# Patient Record
Sex: FEMALE | Race: WHITE | Employment: OTHER | URBAN - METROPOLITAN AREA
[De-identification: names, ages, dates, MRNs, and addresses within clinical notes are randomized per-mention and may not be internally consistent; named-entity substitution may affect disease eponyms.]

---

## 2017-07-10 ENCOUNTER — OFFICE VISIT (OUTPATIENT)
Dept: DERMATOLOGY CLINIC | Facility: CLINIC | Age: 67
End: 2017-07-10

## 2017-07-10 DIAGNOSIS — L82.1 SEBORRHEIC KERATOSES: Primary | ICD-10-CM

## 2017-07-10 DIAGNOSIS — D23.21: ICD-10-CM

## 2017-07-10 DIAGNOSIS — D23.5 BENIGN NEOPLASM OF SKIN OF TRUNK, EXCEPT SCROTUM: ICD-10-CM

## 2017-07-10 DIAGNOSIS — D23.4 BENIGN NEOPLASM OF SCALP AND SKIN OF NECK: ICD-10-CM

## 2017-07-10 DIAGNOSIS — D23.30 BENIGN NEOPLASM OF SKIN OF FACE: ICD-10-CM

## 2017-07-10 DIAGNOSIS — D23.60 BENIGN NEOPLASM OF SKIN OF UPPER LIMB, INCLUDING SHOULDER, UNSPECIFIED LATERALITY: ICD-10-CM

## 2017-07-10 PROCEDURE — 99213 OFFICE O/P EST LOW 20 MIN: CPT | Performed by: DERMATOLOGY

## 2017-07-10 PROCEDURE — 99212 OFFICE O/P EST SF 10 MIN: CPT | Performed by: DERMATOLOGY

## 2017-07-10 RX ORDER — TRIAMCINOLONE ACETONIDE 5 MG/G
CREAM TOPICAL
Qty: 15 G | Refills: 1 | Status: SHIPPED | OUTPATIENT
Start: 2017-07-10 | End: 2018-05-25 | Stop reason: ALTCHOICE

## 2017-07-30 NOTE — PROGRESS NOTES
Johann Akbar is a 73year old female. HPI:     CC:  Patient presents with:  Full Skin Exam: LOV 10/5/2015. Pt presenting for a full body skin exam. Pt has a family hx BCC (mother and father). Lesion: Pt concerned with lesion to R ear. c/o pain 1-2/10. local anesthetic No     Social History Narrative   None on file     Family History   Problem Relation Age of Onset   • Basal Cell Father    • Basal Cell Mother        There were no vitals filed for this visit.     HPI:    Patient presents with:  Full Skin E diagnosis)  Benign neoplasm of skin of upper limb, including shoulder, unspecified laterality  Benign neoplasm of skin of trunk, except scrotum  Benign neoplasm of skin of face  Benign neoplasm of scalp and skin of neck  Benign neoplasm of ear and external

## 2017-10-24 PROBLEM — E03.9 HYPOTHYROIDISM, UNSPECIFIED TYPE: Status: ACTIVE | Noted: 2017-10-24

## 2017-10-24 PROCEDURE — 88175 CYTOPATH C/V AUTO FLUID REDO: CPT | Performed by: INTERNAL MEDICINE

## 2017-10-25 ENCOUNTER — LAB ENCOUNTER (OUTPATIENT)
Dept: LAB | Facility: HOSPITAL | Age: 67
End: 2017-10-25
Attending: INTERNAL MEDICINE
Payer: COMMERCIAL

## 2017-10-25 ENCOUNTER — HOSPITAL ENCOUNTER (OUTPATIENT)
Dept: GENERAL RADIOLOGY | Facility: HOSPITAL | Age: 67
Discharge: HOME | End: 2017-10-25
Attending: INTERNAL MEDICINE
Payer: COMMERCIAL

## 2017-10-25 DIAGNOSIS — Z01.419 WELL WOMAN EXAM WITH ROUTINE GYNECOLOGICAL EXAM: ICD-10-CM

## 2017-10-25 DIAGNOSIS — Z11.59 ENCOUNTER FOR HEPATITIS C SCREENING TEST FOR LOW RISK PATIENT: ICD-10-CM

## 2017-10-25 DIAGNOSIS — R05.9 COUGH: ICD-10-CM

## 2017-10-25 DIAGNOSIS — E03.9 HYPOTHYROIDISM, UNSPECIFIED TYPE: ICD-10-CM

## 2017-10-25 PROCEDURE — 84443 ASSAY THYROID STIM HORMONE: CPT

## 2017-10-25 PROCEDURE — 36415 COLL VENOUS BLD VENIPUNCTURE: CPT

## 2017-10-25 PROCEDURE — 80061 LIPID PANEL: CPT

## 2017-10-25 PROCEDURE — 71020 XR CHEST PA + LAT CHEST (CPT=71020): CPT | Performed by: INTERNAL MEDICINE

## 2017-10-25 PROCEDURE — 80053 COMPREHEN METABOLIC PANEL: CPT

## 2017-10-25 PROCEDURE — 86803 HEPATITIS C AB TEST: CPT

## 2017-10-25 PROCEDURE — 85025 COMPLETE CBC W/AUTO DIFF WBC: CPT

## 2017-10-28 ENCOUNTER — HOSPITAL ENCOUNTER (OUTPATIENT)
Dept: BONE DENSITY | Age: 67
Discharge: HOME | End: 2017-10-28
Attending: INTERNAL MEDICINE
Payer: COMMERCIAL

## 2017-10-28 ENCOUNTER — HOSPITAL ENCOUNTER (OUTPATIENT)
Dept: MAMMOGRAPHY | Age: 67
Discharge: HOME | End: 2017-10-28
Attending: INTERNAL MEDICINE
Payer: COMMERCIAL

## 2017-10-28 DIAGNOSIS — Z13.820 SCREENING FOR OSTEOPOROSIS: ICD-10-CM

## 2017-10-28 DIAGNOSIS — Z12.31 ENCOUNTER FOR SCREENING MAMMOGRAM FOR MALIGNANT NEOPLASM OF BREAST: ICD-10-CM

## 2017-10-28 PROCEDURE — 77067 SCR MAMMO BI INCL CAD: CPT | Performed by: INTERNAL MEDICINE

## 2017-10-28 PROCEDURE — 77063 BREAST TOMOSYNTHESIS BI: CPT | Performed by: INTERNAL MEDICINE

## 2017-10-28 PROCEDURE — 77080 DXA BONE DENSITY AXIAL: CPT | Performed by: INTERNAL MEDICINE

## 2017-12-04 ENCOUNTER — OFFICE VISIT (OUTPATIENT)
Dept: DERMATOLOGY CLINIC | Facility: CLINIC | Age: 67
End: 2017-12-04

## 2017-12-04 DIAGNOSIS — H61.001 CHONDRODERMATITIS NODULARIS HELICIS OF RIGHT EAR: Primary | ICD-10-CM

## 2017-12-04 DIAGNOSIS — D23.60 BENIGN NEOPLASM OF SKIN OF UPPER LIMB, INCLUDING SHOULDER, UNSPECIFIED LATERALITY: ICD-10-CM

## 2017-12-04 DIAGNOSIS — D23.5 BENIGN NEOPLASM OF SKIN OF TRUNK, EXCEPT SCROTUM: ICD-10-CM

## 2017-12-04 DIAGNOSIS — L82.1 SEBORRHEIC KERATOSES: ICD-10-CM

## 2017-12-04 DIAGNOSIS — D23.30 BENIGN NEOPLASM OF SKIN OF FACE: ICD-10-CM

## 2017-12-04 DIAGNOSIS — D23.4 BENIGN NEOPLASM OF SCALP AND SKIN OF NECK: ICD-10-CM

## 2017-12-04 PROCEDURE — 99212 OFFICE O/P EST SF 10 MIN: CPT | Performed by: DERMATOLOGY

## 2017-12-04 PROCEDURE — 99213 OFFICE O/P EST LOW 20 MIN: CPT | Performed by: DERMATOLOGY

## 2017-12-17 NOTE — PROGRESS NOTES
Rajiv Overall is a 78year old female.   HPI:     CC:  Patient presents with:  Derm Problem: \"Established pt\"- Pt would like to f/u with cyst located to right ear pt notes tender when laying down pt notes using the hydrocortisone cream with slight improv Hypothyroid    • Obesity      Past Surgical History:  No date: ARTHROSCOPY, KNEE, SURGICAL; W/LATERAL RELEASE Bilateral  No date:   2012: COLONOSCOPY  No date: D & C    Social History  Social History   Marital status:   Spouse name: N conjunctival mucosa, eyelids, lips external ears, back, chest,/ breasts, axillae,  abdomen, arms, legs, palms.      Multiple light to medium brown, well marginated, uniformly pigmented, macules and papules 6 mm and less scattered on exam. pigmented lesions lesions. Signs and symptoms of skin cancer, ABCDE's of melanoma discussed with patient. Sunscreen use, sun protection, self exams reviewed. Followup as noted RTC routine checkup 6 mos - one year or p.r.n.

## 2018-05-25 ENCOUNTER — APPOINTMENT (OUTPATIENT)
Dept: LAB | Facility: HOSPITAL | Age: 68
End: 2018-05-25
Attending: INTERNAL MEDICINE
Payer: COMMERCIAL

## 2018-05-25 ENCOUNTER — HOSPITAL ENCOUNTER (OUTPATIENT)
Dept: GENERAL RADIOLOGY | Facility: HOSPITAL | Age: 68
Discharge: HOME | End: 2018-05-25
Attending: INTERNAL MEDICINE
Payer: COMMERCIAL

## 2018-05-25 DIAGNOSIS — I10 ESSENTIAL HYPERTENSION: ICD-10-CM

## 2018-05-25 DIAGNOSIS — R06.00 DYSPNEA ON EXERTION: ICD-10-CM

## 2018-05-25 PROCEDURE — 71046 X-RAY EXAM CHEST 2 VIEWS: CPT | Performed by: INTERNAL MEDICINE

## 2018-05-25 PROCEDURE — 83880 ASSAY OF NATRIURETIC PEPTIDE: CPT

## 2018-05-25 PROCEDURE — 85027 COMPLETE CBC AUTOMATED: CPT

## 2018-05-25 PROCEDURE — 36415 COLL VENOUS BLD VENIPUNCTURE: CPT

## 2018-05-25 PROCEDURE — 80053 COMPREHEN METABOLIC PANEL: CPT

## 2018-06-07 ENCOUNTER — LAB ENCOUNTER (OUTPATIENT)
Dept: LAB | Facility: HOSPITAL | Age: 68
End: 2018-06-07
Attending: INTERNAL MEDICINE
Payer: COMMERCIAL

## 2018-06-07 DIAGNOSIS — D64.9 ANEMIA, UNSPECIFIED TYPE: ICD-10-CM

## 2018-06-07 DIAGNOSIS — D64.9 ANEMIA: Primary | ICD-10-CM

## 2018-06-07 PROCEDURE — 84466 ASSAY OF TRANSFERRIN: CPT

## 2018-06-07 PROCEDURE — 36415 COLL VENOUS BLD VENIPUNCTURE: CPT

## 2018-06-07 PROCEDURE — 83540 ASSAY OF IRON: CPT

## 2018-06-07 PROCEDURE — 83021 HEMOGLOBIN CHROMOTOGRAPHY: CPT

## 2018-06-07 PROCEDURE — 83020 HEMOGLOBIN ELECTROPHORESIS: CPT

## 2018-10-11 PROBLEM — D64.9 ANEMIA, UNSPECIFIED TYPE: Status: ACTIVE | Noted: 2018-10-11

## 2018-10-11 PROCEDURE — 88175 CYTOPATH C/V AUTO FLUID REDO: CPT | Performed by: INTERNAL MEDICINE

## 2018-10-13 ENCOUNTER — LAB ENCOUNTER (OUTPATIENT)
Dept: LAB | Facility: HOSPITAL | Age: 68
End: 2018-10-13
Attending: INTERNAL MEDICINE
Payer: COMMERCIAL

## 2018-10-13 ENCOUNTER — HOSPITAL ENCOUNTER (OUTPATIENT)
Dept: MAMMOGRAPHY | Age: 68
Discharge: HOME | End: 2018-10-13
Attending: INTERNAL MEDICINE
Payer: COMMERCIAL

## 2018-10-13 DIAGNOSIS — Z12.31 ENCOUNTER FOR SCREENING MAMMOGRAM FOR MALIGNANT NEOPLASM OF BREAST: ICD-10-CM

## 2018-10-13 DIAGNOSIS — D64.9 ANEMIA, UNSPECIFIED TYPE: ICD-10-CM

## 2018-10-13 DIAGNOSIS — E78.00 HIGH CHOLESTEROL: ICD-10-CM

## 2018-10-13 PROCEDURE — 77067 SCR MAMMO BI INCL CAD: CPT | Performed by: INTERNAL MEDICINE

## 2018-10-13 PROCEDURE — 85025 COMPLETE CBC W/AUTO DIFF WBC: CPT

## 2018-10-13 PROCEDURE — 36415 COLL VENOUS BLD VENIPUNCTURE: CPT

## 2018-10-13 PROCEDURE — 77063 BREAST TOMOSYNTHESIS BI: CPT | Performed by: INTERNAL MEDICINE

## 2018-10-13 PROCEDURE — 80061 LIPID PANEL: CPT

## 2018-10-15 ENCOUNTER — HOSPITAL ENCOUNTER (OUTPATIENT)
Dept: ULTRASOUND IMAGING | Facility: HOSPITAL | Age: 68
Discharge: HOME | End: 2018-10-15
Attending: INTERNAL MEDICINE
Payer: COMMERCIAL

## 2018-10-15 ENCOUNTER — OFFICE VISIT (OUTPATIENT)
Dept: DERMATOLOGY CLINIC | Facility: CLINIC | Age: 68
End: 2018-10-15
Payer: COMMERCIAL

## 2018-10-15 VITALS — HEIGHT: 61 IN | BODY MASS INDEX: 37.76 KG/M2 | WEIGHT: 200 LBS

## 2018-10-15 DIAGNOSIS — H61.003: Primary | ICD-10-CM

## 2018-10-15 DIAGNOSIS — D64.9 ANEMIA, UNSPECIFIED TYPE: ICD-10-CM

## 2018-10-15 DIAGNOSIS — D23.30 BENIGN NEOPLASM OF SKIN OF FACE: ICD-10-CM

## 2018-10-15 DIAGNOSIS — D23.5 BENIGN NEOPLASM OF SKIN OF TRUNK, EXCEPT SCROTUM: ICD-10-CM

## 2018-10-15 DIAGNOSIS — D23.60 BENIGN NEOPLASM OF SKIN OF UPPER LIMB, INCLUDING SHOULDER, UNSPECIFIED LATERALITY: ICD-10-CM

## 2018-10-15 DIAGNOSIS — R10.30 LOWER ABDOMINAL PAIN: ICD-10-CM

## 2018-10-15 DIAGNOSIS — L82.1 SEBORRHEIC KERATOSES: ICD-10-CM

## 2018-10-15 DIAGNOSIS — D23.4 BENIGN NEOPLASM OF SCALP AND SKIN OF NECK: ICD-10-CM

## 2018-10-15 PROCEDURE — 99212 OFFICE O/P EST SF 10 MIN: CPT | Performed by: DERMATOLOGY

## 2018-10-15 PROCEDURE — 76830 TRANSVAGINAL US NON-OB: CPT | Performed by: INTERNAL MEDICINE

## 2018-10-15 PROCEDURE — 99213 OFFICE O/P EST LOW 20 MIN: CPT | Performed by: DERMATOLOGY

## 2018-10-15 PROCEDURE — 76856 US EXAM PELVIC COMPLETE: CPT | Performed by: INTERNAL MEDICINE

## 2018-10-28 NOTE — PROGRESS NOTES
Ayo Renee is a 78year old female. HPI:     CC:  Patient presents with:  Lesion: scaly crust ,painful to touch ,patient denies personal HX but parents had BCC         Allergies:  Patient has no known allergies.     HISTORY:    Past Medical History: Socioeconomic History      Marital status:       Spouse name: Not on file      Number of children: Not on file      Years of education: Not on file      Highest education level: Not on file    Social Needs      Financial resource strain: Not on file oriented in no acute distress. Exam total-body performed, including scalp, head, neck, face,nails, hair, external eyes, including conjunctival mucosa, eyelids, lips external ears, back, chest,/ breasts, axillae,  abdomen, arms, legs, palms.      Multiple l lentigines over the arms hands. No suspicious lesions    Other benign nevi lentigines keratoses no significant changes numerous benign keratoses. Family history of [address] continue regular follow-up  Please refer to map for specific lesions.   See additiona

## 2018-12-17 ENCOUNTER — HOSPITAL ENCOUNTER (OUTPATIENT)
Dept: GENERAL RADIOLOGY | Facility: HOSPITAL | Age: 68
Discharge: HOME | End: 2018-12-17
Attending: CHIROPRACTOR
Payer: MEDICARE

## 2018-12-17 DIAGNOSIS — M54.16 LUMBAR RADICULOPATHY: ICD-10-CM

## 2018-12-17 DIAGNOSIS — M54.50 LOW BACK PAIN: ICD-10-CM

## 2018-12-17 PROCEDURE — 72110 X-RAY EXAM L-2 SPINE 4/>VWS: CPT | Performed by: CHIROPRACTOR

## 2019-02-07 ENCOUNTER — LAB ENCOUNTER (OUTPATIENT)
Dept: LAB | Facility: HOSPITAL | Age: 69
End: 2019-02-07
Attending: INTERNAL MEDICINE
Payer: MEDICARE

## 2019-02-07 DIAGNOSIS — D64.9 ANEMIA, UNSPECIFIED TYPE: ICD-10-CM

## 2019-02-07 DIAGNOSIS — G89.29 CHRONIC MIDLINE LOW BACK PAIN WITHOUT SCIATICA: ICD-10-CM

## 2019-02-07 DIAGNOSIS — M54.50 CHRONIC MIDLINE LOW BACK PAIN WITHOUT SCIATICA: ICD-10-CM

## 2019-02-07 LAB
BASOPHILS # BLD AUTO: 0.03 X10(3) UL (ref 0–0.2)
BASOPHILS NFR BLD AUTO: 0.6 %
DEPRECATED RDW RBC AUTO: 46 FL (ref 35.1–46.3)
EOSINOPHIL # BLD AUTO: 0.21 X10(3) UL (ref 0–0.7)
EOSINOPHIL NFR BLD AUTO: 4.3 %
ERYTHROCYTE [DISTWIDTH] IN BLOOD BY AUTOMATED COUNT: 13.1 % (ref 11–15)
HCT VFR BLD AUTO: 35 % (ref 35–48)
HGB BLD-MCNC: 11.5 G/DL (ref 12–16)
IGA SERPL-MCNC: 167 MG/DL (ref 68–378)
IGM SERPL-MCNC: 90 MG/DL (ref 60–263)
IMM GRANULOCYTES # BLD AUTO: 0.01 X10(3) UL (ref 0–1)
IMM GRANULOCYTES NFR BLD: 0.2 %
IMMUNOGLOBULIN PNL SER-MCNC: 1084 MG/DL (ref 694–1618)
LYMPHOCYTES # BLD AUTO: 1.83 X10(3) UL (ref 1–4)
LYMPHOCYTES NFR BLD AUTO: 37.6 %
MCH RBC QN AUTO: 31.5 PG (ref 26–34)
MCHC RBC AUTO-ENTMCNC: 32.9 G/DL (ref 31–37)
MCV RBC AUTO: 95.9 FL (ref 80–100)
MONOCYTES # BLD AUTO: 0.28 X10(3) UL (ref 0.1–1)
MONOCYTES NFR BLD AUTO: 5.7 %
NEUTROPHILS # BLD AUTO: 2.51 X10 (3) UL (ref 1.5–7.7)
NEUTROPHILS # BLD AUTO: 2.51 X10(3) UL (ref 1.5–7.7)
NEUTROPHILS NFR BLD AUTO: 51.6 %
PLATELET # BLD AUTO: 300 10(3)UL (ref 150–450)
RBC # BLD AUTO: 3.65 X10(6)UL (ref 3.8–5.3)
WBC # BLD AUTO: 4.9 X10(3) UL (ref 4–11)

## 2019-02-07 PROCEDURE — 84165 PROTEIN E-PHORESIS SERUM: CPT

## 2019-02-07 PROCEDURE — 36415 COLL VENOUS BLD VENIPUNCTURE: CPT

## 2019-02-07 PROCEDURE — 86334 IMMUNOFIX E-PHORESIS SERUM: CPT

## 2019-02-07 PROCEDURE — 85025 COMPLETE CBC W/AUTO DIFF WBC: CPT

## 2019-02-07 PROCEDURE — 82784 ASSAY IGA/IGD/IGG/IGM EACH: CPT

## 2019-02-11 LAB
ALBUMIN SERPL ELPH-MCNC: 4.31 G/DL (ref 3.75–5.21)
ALBUMIN/GLOB SERPL: 1.44 {RATIO} (ref 1–2)
ALPHA1 GLOB SERPL ELPH-MCNC: 0.28 G/DL (ref 0.19–0.46)
ALPHA2 GLOB SERPL ELPH-MCNC: 0.71 G/DL (ref 0.48–1.05)
B-GLOBULIN SERPL ELPH-MCNC: 0.88 G/DL (ref 0.68–1.23)
GAMMA GLOB SERPL ELPH-MCNC: 1.12 G/DL (ref 0.62–1.7)
TOTAL PROTEIN (SPECIAL TESTING): 7.3 G/DL (ref 6.5–9.1)

## 2019-08-27 PROBLEM — M54.41 CHRONIC BILATERAL LOW BACK PAIN WITH RIGHT-SIDED SCIATICA: Status: ACTIVE | Noted: 2019-08-27

## 2019-08-27 PROBLEM — M19.90 ARTHRITIS: Status: ACTIVE | Noted: 2019-08-27

## 2019-08-27 PROBLEM — M47.816 LUMBAR SPONDYLOSIS: Status: ACTIVE | Noted: 2019-08-27

## 2019-08-27 PROBLEM — I10 ESSENTIAL HYPERTENSION: Status: ACTIVE | Noted: 2019-08-27

## 2019-08-27 PROBLEM — G89.29 CHRONIC BILATERAL LOW BACK PAIN WITH RIGHT-SIDED SCIATICA: Status: ACTIVE | Noted: 2019-08-27

## 2019-08-28 ENCOUNTER — LAB ENCOUNTER (OUTPATIENT)
Dept: LAB | Facility: HOSPITAL | Age: 69
End: 2019-08-28
Attending: INTERNAL MEDICINE
Payer: MEDICARE

## 2019-08-28 DIAGNOSIS — I10 ESSENTIAL HYPERTENSION: ICD-10-CM

## 2019-08-28 DIAGNOSIS — M19.90 ARTHRITIS: ICD-10-CM

## 2019-08-28 LAB
ALBUMIN SERPL-MCNC: 3.5 G/DL (ref 3.4–5)
ANION GAP SERPL CALC-SCNC: 5 MMOL/L (ref 0–18)
BASOPHILS # BLD AUTO: 0.04 X10(3) UL (ref 0–0.2)
BASOPHILS NFR BLD AUTO: 0.8 %
BUN BLD-MCNC: 30 MG/DL (ref 7–18)
BUN/CREAT SERPL: 40.5 (ref 10–20)
CALCIUM BLD-MCNC: 9 MG/DL (ref 8.5–10.1)
CHLORIDE SERPL-SCNC: 108 MMOL/L (ref 98–112)
CO2 SERPL-SCNC: 26 MMOL/L (ref 21–32)
CREAT BLD-MCNC: 0.74 MG/DL (ref 0.55–1.02)
CRP SERPL-MCNC: 0.7 MG/DL (ref ?–0.3)
DEPRECATED RDW RBC AUTO: 46.2 FL (ref 35.1–46.3)
EOSINOPHIL # BLD AUTO: 0.23 X10(3) UL (ref 0–0.7)
EOSINOPHIL NFR BLD AUTO: 4.8 %
ERYTHROCYTE [DISTWIDTH] IN BLOOD BY AUTOMATED COUNT: 12.9 % (ref 11–15)
ERYTHROCYTE [SEDIMENTATION RATE] IN BLOOD: 25 MM/HR (ref 0–30)
GLUCOSE BLD-MCNC: 109 MG/DL (ref 70–99)
HCT VFR BLD AUTO: 33.6 % (ref 35–48)
HGB BLD-MCNC: 11.1 G/DL (ref 12–16)
IMM GRANULOCYTES # BLD AUTO: 0.01 X10(3) UL (ref 0–1)
IMM GRANULOCYTES NFR BLD: 0.2 %
LYMPHOCYTES # BLD AUTO: 1.67 X10(3) UL (ref 1–4)
LYMPHOCYTES NFR BLD AUTO: 35 %
MCH RBC QN AUTO: 32.5 PG (ref 26–34)
MCHC RBC AUTO-ENTMCNC: 33 G/DL (ref 31–37)
MCV RBC AUTO: 98.2 FL (ref 80–100)
MONOCYTES # BLD AUTO: 0.38 X10(3) UL (ref 0.1–1)
MONOCYTES NFR BLD AUTO: 8 %
NEUTROPHILS # BLD AUTO: 2.44 X10 (3) UL (ref 1.5–7.7)
NEUTROPHILS # BLD AUTO: 2.44 X10(3) UL (ref 1.5–7.7)
NEUTROPHILS NFR BLD AUTO: 51.2 %
OSMOLALITY SERPL CALC.SUM OF ELEC: 295 MOSM/KG (ref 275–295)
PHOSPHATE SERPL-MCNC: 3.5 MG/DL (ref 2.5–4.9)
PLATELET # BLD AUTO: 272 10(3)UL (ref 150–450)
POTASSIUM SERPL-SCNC: 4.8 MMOL/L (ref 3.5–5.1)
RBC # BLD AUTO: 3.42 X10(6)UL (ref 3.8–5.3)
RHEUMATOID FACT SERPL-ACNC: <10 IU/ML (ref ?–15)
SODIUM SERPL-SCNC: 139 MMOL/L (ref 136–145)
WBC # BLD AUTO: 4.8 X10(3) UL (ref 4–11)

## 2019-08-28 PROCEDURE — 86200 CCP ANTIBODY: CPT

## 2019-08-28 PROCEDURE — 86140 C-REACTIVE PROTEIN: CPT

## 2019-08-28 PROCEDURE — 85652 RBC SED RATE AUTOMATED: CPT

## 2019-08-28 PROCEDURE — 36415 COLL VENOUS BLD VENIPUNCTURE: CPT

## 2019-08-28 PROCEDURE — 85025 COMPLETE CBC W/AUTO DIFF WBC: CPT

## 2019-08-28 PROCEDURE — 80069 RENAL FUNCTION PANEL: CPT

## 2019-08-28 PROCEDURE — 86431 RHEUMATOID FACTOR QUANT: CPT

## 2019-08-30 LAB — CCP IGG SERPL-ACNC: 1.2 U/ML (ref 0–6.9)

## 2019-09-10 ENCOUNTER — OFFICE VISIT (OUTPATIENT)
Dept: NEUROLOGY | Facility: CLINIC | Age: 69
End: 2019-09-10
Payer: MEDICARE

## 2019-09-10 ENCOUNTER — HOSPITAL ENCOUNTER (OUTPATIENT)
Dept: GENERAL RADIOLOGY | Facility: HOSPITAL | Age: 69
Discharge: HOME | End: 2019-09-10
Attending: PHYSICAL MEDICINE & REHABILITATION
Payer: MEDICARE

## 2019-09-10 VITALS
BODY MASS INDEX: 36.25 KG/M2 | RESPIRATION RATE: 16 BRPM | HEART RATE: 76 BPM | DIASTOLIC BLOOD PRESSURE: 82 MMHG | HEIGHT: 61 IN | WEIGHT: 192 LBS | SYSTOLIC BLOOD PRESSURE: 142 MMHG

## 2019-09-10 DIAGNOSIS — M51.36 LUMBAR ADJACENT SEGMENT DISEASE WITH SPONDYLOLISTHESIS: ICD-10-CM

## 2019-09-10 DIAGNOSIS — M43.16 LUMBAR ADJACENT SEGMENT DISEASE WITH SPONDYLOLISTHESIS: ICD-10-CM

## 2019-09-10 DIAGNOSIS — M43.16 SPONDYLOLISTHESIS AT L4-L5 LEVEL: Primary | ICD-10-CM

## 2019-09-10 PROCEDURE — 72110 X-RAY EXAM L-2 SPINE 4/>VWS: CPT | Performed by: PHYSICAL MEDICINE & REHABILITATION

## 2019-09-10 PROCEDURE — 99204 OFFICE O/P NEW MOD 45 MIN: CPT | Performed by: PHYSICAL MEDICINE & REHABILITATION

## 2019-09-10 RX ORDER — METHYLPREDNISOLONE 4 MG/1
TABLET ORAL
Qty: 1 PACKAGE | Refills: 0 | Status: SHIPPED | OUTPATIENT
Start: 2019-09-10 | End: 2019-09-24 | Stop reason: ALTCHOICE

## 2019-09-10 NOTE — PATIENT INSTRUCTIONS
Stop the ibuprofen while taking medrol dosepak. Do not start physical therapy until you have heard back regarding the results of the Xray.

## 2019-09-10 NOTE — H&P
Sugey Vivas 38    History and Physical    Lovebill Rushing Patient Status:  No patient class for patient encounter    1950 MRN [id number]   [facility] Attending No att. providers found   Hosp Day # 0 SURGICAL; W/LATERAL RELEASE Bilateral    •      • COLONOSCOPY  2012   • D & C       Family History   Problem Relation Age of Onset   • Basal Cell Father    • Hypertension Father    • Heart Disorder Father    • Basal Cell Mother    • Cancer M Pulmonary/Chest: Effort normal.   Musculoskeletal:   Lumbar range of motion: Pain with lumbar extension; no pain with lumbar flexion. Palpation: No pain with palpation of the paravertebral musculature, spinous processes, or SI joints bilaterally.  No a gluteal granulomas.     =====  CONCLUSION: No acute osseous abnormality of the lumbar spine. There is grade 1 anterolisthesis of L4 on L5 without pars defects. There is disc disease at L3-4 and L4-5 and mild osteoarthritis throughout the lumbar spine. lumbar stabilization, core strengthening. Medrol Dosepak, patient instructed to stop ibuprofen while taking the Medrol Dosepak. Follow-up at/near completion of physical therapy assuming no significant instability seen on flexion and extension views.     H

## 2019-09-11 ENCOUNTER — TELEPHONE (OUTPATIENT)
Dept: NEUROLOGY | Facility: CLINIC | Age: 69
End: 2019-09-11

## 2019-09-11 NOTE — TELEPHONE ENCOUNTER
Informed patient of imaging results and recommendation's per Dr. Anne Sandhoff. Patient verbalized understanding. Patient states [facility] at [facility] has no availability until 10/10/19.  Informed patient she should try and see if Prosper Duggan or Kee Goyal has earlier appt

## 2019-09-11 NOTE — TELEPHONE ENCOUNTER
Notes recorded by Myles April, DO on 9/11/2019 at 1:57 PM CDT  Please let the patient know the Xrays are good - there is no abnormal movement seen with forward and backward bending. She should proceed with physical therapy.

## 2019-09-24 PROBLEM — M48.061 SPINAL STENOSIS AT L4-L5 LEVEL: Status: ACTIVE | Noted: 2019-09-24

## 2019-09-26 ENCOUNTER — OFFICE VISIT (OUTPATIENT)
Dept: PHYSICAL THERAPY | Age: 69
End: 2019-09-26
Attending: PHYSICAL MEDICINE & REHABILITATION
Payer: MEDICARE

## 2019-09-26 DIAGNOSIS — M43.16 SPONDYLOLISTHESIS AT L4-L5 LEVEL: ICD-10-CM

## 2019-09-26 PROCEDURE — 97530 THERAPEUTIC ACTIVITIES: CPT

## 2019-09-26 PROCEDURE — 97162 PT EVAL MOD COMPLEX 30 MIN: CPT

## 2019-09-26 PROCEDURE — 97110 THERAPEUTIC EXERCISES: CPT

## 2019-09-26 NOTE — PATIENT INSTRUCTIONS
Access Code: [email or web address]   URL: [email or web address]   Date: 09/26/2019   Prepared by: Brian Reyna     Exercises  Supine Pelvic Tilt - 10 reps - 2 sets - 2x daily - 7x weekly  Bent Knee Fallouts - 10 reps - 2 sets - 2x daily - 7x w

## 2019-09-26 NOTE — PROGRESS NOTES
SPINE EVALUATION:   Referring Physician: Dr. Nathaly Sánchez  Diagnosis:    Spondylolisthesis at L4-L5 level (M43.16) Date of Service: 9/26/2019     PATIENT Chrissy Carroll is a 80year old female who presents to therapy today with complaints of back amador anesthesia, and KATERINA LE N/T.    Sarah Chavez presents to physical therapy evaluation with primary c/o low back pain with radiating symptoms down her R leg and symptoms do not go below the knee.  The results of the objective tests and measures show decre decreased  Piriformis: R decreased; L decreased  Quads: R NT; L NT  Gastroc-soleus: R NT; L NT     Special tests:   Slump test: R = (-), L = (-)  Supine SLR test: R = (-), L = (-)  Repeated lumbar movement testing: (Baseline symptoms = lumbar pain)  1.  Fle at least 4+/5 MMT grade strength to perform sit to stand transfer with good motor control and without signs of imbalance.    5. Patient will demonstrate normalized gait mechanics with sufficient hip extension and good foot clearance to reduce risk for falls

## 2019-09-27 ENCOUNTER — APPOINTMENT (OUTPATIENT)
Dept: PHYSICAL THERAPY | Age: 69
End: 2019-09-27
Attending: PHYSICAL MEDICINE & REHABILITATION
Payer: MEDICARE

## 2019-10-01 ENCOUNTER — APPOINTMENT (OUTPATIENT)
Dept: PHYSICAL THERAPY | Age: 69
End: 2019-10-01
Attending: PHYSICAL MEDICINE & REHABILITATION
Payer: MEDICARE

## 2019-10-01 DIAGNOSIS — M43.16 SPONDYLOLISTHESIS AT L4-L5 LEVEL: ICD-10-CM

## 2019-10-01 PROCEDURE — 97110 THERAPEUTIC EXERCISES: CPT

## 2019-10-01 PROCEDURE — 97140 MANUAL THERAPY 1/> REGIONS: CPT

## 2019-10-01 PROCEDURE — 97112 NEUROMUSCULAR REEDUCATION: CPT

## 2019-10-01 NOTE — PROGRESS NOTES
Dx:     Spondylolisthesis at L4-L5 level (M43.16)     Insurance (Authorized # of Visits):  Medicare BCBS PPO; 12 visits; cert ends 61/72/6721          Authorizing Physician: Dr. Faye Bradley MD visit: none scheduled  Fall Risk: elevated         Precaution demonstrate normalized gait mechanics with sufficient hip extension and good foot clearance to reduce risk for falls. Plan: Focus on ROM, strengthening and add balance exercises to improve transfers and gait.      Date: 10/1/2019  TX#: 2/12 Date:

## 2019-10-03 ENCOUNTER — APPOINTMENT (OUTPATIENT)
Dept: PHYSICAL THERAPY | Age: 69
End: 2019-10-03
Attending: PHYSICAL MEDICINE & REHABILITATION
Payer: MEDICARE

## 2019-10-08 ENCOUNTER — OFFICE VISIT (OUTPATIENT)
Dept: PHYSICAL THERAPY | Age: 69
End: 2019-10-08
Attending: PHYSICAL MEDICINE & REHABILITATION
Payer: MEDICARE

## 2019-10-08 DIAGNOSIS — M43.16 SPONDYLOLISTHESIS AT L4-L5 LEVEL: ICD-10-CM

## 2019-10-08 PROCEDURE — 97110 THERAPEUTIC EXERCISES: CPT

## 2019-10-08 PROCEDURE — 97140 MANUAL THERAPY 1/> REGIONS: CPT

## 2019-10-08 NOTE — PROGRESS NOTES
Dx:     Spondylolisthesis at L4-L5 level (M43.16)     Insurance (Authorized # of Visits):  Medicare BCBS PPO; 12 visits; cert ends 65/77/6591          Authorizing Physician: Dr. Tracy Samples  Next MD visit: none scheduled  Fall Risk: elevated         Precaution alignment to sit for at least 60 minutes without increased symptoms to ease her ability to travel. 3. Patient will have trunk flex and ext ROM WFL to bend forward to pick items up from the floor without difficulty.   4. Patient will have at least 4+/5 MMT

## 2019-10-10 ENCOUNTER — APPOINTMENT (OUTPATIENT)
Dept: PHYSICAL THERAPY | Age: 69
End: 2019-10-10
Attending: PHYSICAL MEDICINE & REHABILITATION
Payer: MEDICARE

## 2019-10-11 ENCOUNTER — OFFICE VISIT (OUTPATIENT)
Dept: PHYSICAL THERAPY | Age: 69
End: 2019-10-11
Attending: PHYSICAL MEDICINE & REHABILITATION
Payer: MEDICARE

## 2019-10-11 DIAGNOSIS — M43.16 SPONDYLOLISTHESIS AT L4-L5 LEVEL: ICD-10-CM

## 2019-10-11 PROCEDURE — 97112 NEUROMUSCULAR REEDUCATION: CPT

## 2019-10-11 PROCEDURE — 97140 MANUAL THERAPY 1/> REGIONS: CPT

## 2019-10-11 PROCEDURE — 97110 THERAPEUTIC EXERCISES: CPT

## 2019-10-15 ENCOUNTER — APPOINTMENT (OUTPATIENT)
Dept: PHYSICAL THERAPY | Age: 69
End: 2019-10-15
Attending: PHYSICAL MEDICINE & REHABILITATION
Payer: MEDICARE

## 2019-10-16 ENCOUNTER — OFFICE VISIT (OUTPATIENT)
Dept: PHYSICAL THERAPY | Age: 69
End: 2019-10-16
Attending: PHYSICAL MEDICINE & REHABILITATION
Payer: MEDICARE

## 2019-10-16 DIAGNOSIS — M43.16 SPONDYLOLISTHESIS AT L4-L5 LEVEL: ICD-10-CM

## 2019-10-16 PROCEDURE — 97140 MANUAL THERAPY 1/> REGIONS: CPT

## 2019-10-16 PROCEDURE — 97112 NEUROMUSCULAR REEDUCATION: CPT

## 2019-10-16 NOTE — PROGRESS NOTES
Progress Summary  Pt has attended 5 visits in Physical Therapy.      Dx:     Spondylolisthesis at L4-L5 level (M43.16)     Insurance (Authorized # of Visits):  Medicare BCBS PPO; 12 visits; cert ends 43/88/3511          Authorizing Physician: Dr. Srinivas Perry improvements in pain during the day after she performs her exercises, reporting feeling 50% better during the daytime.  However, because the patient reports increasing severity of symptoms at night, her functional ability is limited as indicated by decrease any questions, please contact me at Dept: [phone].     Sincerely,  Electronically signed by therapist: Shannon Jain PT     Physician's certification required:  Yes  Please co-sign or sign and return this letter via fax as soon as possible to 331- engaging gluteals x 3 min  Sitting: HS curls with tband; 10x2 Neuro Re-ed  Diaphragmatic breathing in sitting x5 min  Sitting: HS curls with tband; 10x2  Pelvic tilts in sitting ; 10x2             HEP: pelvic tilts; BKFOs; sciatic n.  Neuromob; toe curls; L

## 2019-10-16 NOTE — PROGRESS NOTES
Dx:     Spondylolisthesis at L4-L5 level (M43.16)     Insurance (Authorized # of Visits):  Medicare BCBS PPO; 12 visits; cert ends 12/52/7483          Authorizing Physician: Dr. Ballesteros Reasons  Next MD visit: none scheduled  Fall Risk: elevated         Precaution mechanics with sufficient hip extension and good foot clearance to reduce risk for falls. Plan: Focus on ROM, strengthening and add balance exercises to improve transfers and gait.      Date: 10/1/2019  TX#: 2/12 Date: 10/9/2019               TX#: 3/12

## 2019-10-17 ENCOUNTER — APPOINTMENT (OUTPATIENT)
Dept: PHYSICAL THERAPY | Age: 69
End: 2019-10-17
Attending: PHYSICAL MEDICINE & REHABILITATION
Payer: MEDICARE

## 2019-10-17 ENCOUNTER — OFFICE VISIT (OUTPATIENT)
Dept: NEUROLOGY | Facility: CLINIC | Age: 69
End: 2019-10-17
Payer: MEDICARE

## 2019-10-17 VITALS
DIASTOLIC BLOOD PRESSURE: 76 MMHG | RESPIRATION RATE: 20 BRPM | BODY MASS INDEX: 36.25 KG/M2 | HEIGHT: 61 IN | WEIGHT: 192 LBS | HEART RATE: 84 BPM | SYSTOLIC BLOOD PRESSURE: 134 MMHG

## 2019-10-17 DIAGNOSIS — M43.16 SPONDYLOLISTHESIS AT L4-L5 LEVEL: Primary | ICD-10-CM

## 2019-10-17 PROCEDURE — 99214 OFFICE O/P EST MOD 30 MIN: CPT | Performed by: PHYSICAL MEDICINE & REHABILITATION

## 2019-10-17 RX ORDER — DOXEPIN HYDROCHLORIDE 10 MG/1
10 CAPSULE ORAL NIGHTLY
Qty: 30 CAPSULE | Refills: 0 | Status: SHIPPED | OUTPATIENT
Start: 2019-10-17 | End: 2019-11-05 | Stop reason: ALTCHOICE

## 2019-10-17 NOTE — PROGRESS NOTES
HPI:    Patient ID: Ayo Renee is a 80year old female. 60-year-old female presents for follow-up. Has been participating physical therapy with noted improvement in symptoms, however she still awakens at nighttime due to pain.   She localizes her pa SODIUM 100 MCG Oral Tab, TAKE ONE TABLET BY MOUTH ONE TIME DAILY , Disp: 90 tablet, Rfl: 2      Allergies:No Known Allergies   PHYSICAL EXAM:   Physical Exam   Constitutional: She appears well-developed and well-nourished.    HENT:   Head: Normocephalic and

## 2019-10-21 ENCOUNTER — OFFICE VISIT (OUTPATIENT)
Dept: PHYSICAL THERAPY | Age: 69
End: 2019-10-21
Attending: PHYSICAL MEDICINE & REHABILITATION
Payer: MEDICARE

## 2019-10-21 ENCOUNTER — TELEPHONE (OUTPATIENT)
Dept: NEUROLOGY | Facility: CLINIC | Age: 69
End: 2019-10-21

## 2019-10-21 DIAGNOSIS — M43.16 SPONDYLOLISTHESIS AT L4-L5 LEVEL: Primary | ICD-10-CM

## 2019-10-21 DIAGNOSIS — M43.16 SPONDYLOLISTHESIS AT L4-L5 LEVEL: ICD-10-CM

## 2019-10-21 PROCEDURE — 97112 NEUROMUSCULAR REEDUCATION: CPT

## 2019-10-21 PROCEDURE — 97140 MANUAL THERAPY 1/> REGIONS: CPT

## 2019-10-21 RX ORDER — METHYLPREDNISOLONE 4 MG/1
TABLET ORAL
Qty: 1 PACKAGE | Refills: 0 | Status: SHIPPED | OUTPATIENT
Start: 2019-10-21 | End: 2019-11-05 | Stop reason: ALTCHOICE

## 2019-10-21 NOTE — PROGRESS NOTES
Dx:     Spondylolisthesis at L4-L5 level (M43.16)     Insurance (Authorized # of Visits):  Medicare BCBS PPO; 12 visits; cert ends 6/96/1880       Authorizing Physician: Dr. Janeth Carpenter  Next MD visit: none scheduled  Fall Risk: elevated         Precautions: optimize gains made in PT to home and community settings and for self management of prophylactic care. 2. Patient will demonstrate good postural alignment to sit for at least 60 minutes without increased symptoms to ease her ability to travel.   3. Patient x 12 min  Supine: PROM B hips; PROM R knee x 8 min  Harmonic oscillation B hips x5 min Manual Therapy  Sidelying: MFR lumbar paraspinals x 12 min  Supine: PROM B hips; PROM R knee x 8 min  Harmonic oscillation B hips x5 min   Neuro Re-ed  Gait training: em

## 2019-10-21 NOTE — TELEPHONE ENCOUNTER
Spoke to patient she state she is taking Doxepin. Say she is still waking up in the middle of the night. When waking up in the morning she is icing and after eating something takes 3 Ibuprofen.  Patient is asking if she can take another dose of steriods sin

## 2019-10-21 NOTE — TELEPHONE ENCOUNTER
Medrol dosepack prescribed. Please let the patient know if we proceed with injection we should do it at least a week after the medrol dosepack has been completed, if not later.     Soniya Minium DO  Physical Medicine and Rehabilitation  [facility]

## 2019-10-22 ENCOUNTER — APPOINTMENT (OUTPATIENT)
Dept: PHYSICAL THERAPY | Age: 69
End: 2019-10-22
Attending: PHYSICAL MEDICINE & REHABILITATION
Payer: MEDICARE

## 2019-10-23 ENCOUNTER — OFFICE VISIT (OUTPATIENT)
Dept: PHYSICAL THERAPY | Age: 69
End: 2019-10-23
Attending: PHYSICAL MEDICINE & REHABILITATION
Payer: MEDICARE

## 2019-10-23 DIAGNOSIS — M43.16 SPONDYLOLISTHESIS AT L4-L5 LEVEL: ICD-10-CM

## 2019-10-23 PROCEDURE — 97140 MANUAL THERAPY 1/> REGIONS: CPT

## 2019-10-23 PROCEDURE — 97112 NEUROMUSCULAR REEDUCATION: CPT

## 2019-11-04 ENCOUNTER — TELEPHONE (OUTPATIENT)
Dept: NEUROLOGY | Facility: CLINIC | Age: 69
End: 2019-11-04

## 2019-11-04 DIAGNOSIS — M43.16 SPONDYLOLISTHESIS AT L4-L5 LEVEL: Primary | ICD-10-CM

## 2019-11-04 NOTE — TELEPHONE ENCOUNTER
S/w patient who states she has been off the medrol dosepak for about 1.5 weeks and her pain is starting to return. She would like to proceed injection discussed at [address] [place] [address]. Informed patient we will get authorization and call her to schedule afterwards.      Lucas

## 2019-11-04 NOTE — TELEPHONE ENCOUNTER
Patient has been scheduled for a bilateral L4 transforaminal epidural steroid injection on 11/20/19 at the [facility]. Medications and allergies reviewed.  Patient informed to hold aspirins, nsaids, blood thinners, multivitamins, vitamin E and fish oils 3-7 days

## 2019-11-04 NOTE — TELEPHONE ENCOUNTER
Medicare Online for authorization of procedure bilateral L4 transforaminal epidural steroid injection CPT code: 28545-70. Procedure is a covered benefit and does not require authorization. Will inform Nursing.

## 2019-11-05 ENCOUNTER — LAB ENCOUNTER (OUTPATIENT)
Dept: LAB | Facility: HOSPITAL | Age: 69
End: 2019-11-05
Attending: INTERNAL MEDICINE
Payer: MEDICARE

## 2019-11-05 ENCOUNTER — OFFICE VISIT (OUTPATIENT)
Dept: PHYSICAL THERAPY | Age: 69
End: 2019-11-05
Attending: PHYSICAL MEDICINE & REHABILITATION
Payer: MEDICARE

## 2019-11-05 ENCOUNTER — HOSPITAL ENCOUNTER (OUTPATIENT)
Dept: MAMMOGRAPHY | Age: 69
Discharge: HOME | End: 2019-11-05
Attending: INTERNAL MEDICINE
Payer: MEDICARE

## 2019-11-05 DIAGNOSIS — Z12.31 BREAST CANCER SCREENING BY MAMMOGRAM: ICD-10-CM

## 2019-11-05 DIAGNOSIS — Z13.820 SCREENING FOR OSTEOPOROSIS: ICD-10-CM

## 2019-11-05 DIAGNOSIS — Z12.31 VISIT FOR SCREENING MAMMOGRAM: ICD-10-CM

## 2019-11-05 DIAGNOSIS — Z13.29 ENCOUNTER FOR SCREENING FOR ENDOCRINE DISORDER: ICD-10-CM

## 2019-11-05 DIAGNOSIS — R26.89 BALANCE DISORDER: ICD-10-CM

## 2019-11-05 DIAGNOSIS — Z13.0 SCREENING FOR DISORDER OF BLOOD AND BLOOD-FORMING ORGANS: ICD-10-CM

## 2019-11-05 PROBLEM — D64.9 ANEMIA, UNSPECIFIED TYPE: Status: RESOLVED | Noted: 2018-10-11 | Resolved: 2019-11-05

## 2019-11-05 PROCEDURE — 84439 ASSAY OF FREE THYROXINE: CPT

## 2019-11-05 PROCEDURE — 77063 BREAST TOMOSYNTHESIS BI: CPT | Performed by: INTERNAL MEDICINE

## 2019-11-05 PROCEDURE — 84443 ASSAY THYROID STIM HORMONE: CPT

## 2019-11-05 PROCEDURE — 97112 NEUROMUSCULAR REEDUCATION: CPT

## 2019-11-05 PROCEDURE — 85025 COMPLETE CBC W/AUTO DIFF WBC: CPT

## 2019-11-05 PROCEDURE — 82306 VITAMIN D 25 HYDROXY: CPT

## 2019-11-05 PROCEDURE — 97110 THERAPEUTIC EXERCISES: CPT

## 2019-11-05 PROCEDURE — 82607 VITAMIN B-12: CPT

## 2019-11-05 PROCEDURE — 77067 SCR MAMMO BI INCL CAD: CPT | Performed by: INTERNAL MEDICINE

## 2019-11-05 PROCEDURE — 97140 MANUAL THERAPY 1/> REGIONS: CPT

## 2019-11-05 PROCEDURE — 36415 COLL VENOUS BLD VENIPUNCTURE: CPT

## 2019-11-05 NOTE — PROGRESS NOTES
Dx:     Spondylolisthesis at L4-L5 level (M43.16)     Insurance (Authorized # of Visits):  Medicare BCBS PPO; 12 visits; cert ends 6/18/7131       Authorizing Physician: Dr. Fred Navarro  Next MD visit: none scheduled  Fall Risk: elevated         Precautions: h trunk and engage gluteals when weight shifting to reduce signs of hip drop, particularly when loading through R LE. Goals: (to be met in 12 visits)   1.  Patient will be independent with HEP to optimize gains made in PT to home and community settings a min  Supine: PROM B hips; PROM R knee x 6 min  Harmonic oscillation B hips x5 min Manual Therapy  Sidelying: MFR lumbar paraspinals x 4 min  Supine: PROM B hips; PROM R knee x 5 min  Harmonic oscillation B hips x4 min   Neuro Re-ed  Gait training: emphasis

## 2019-11-08 ENCOUNTER — OFFICE VISIT (OUTPATIENT)
Dept: PHYSICAL THERAPY | Age: 69
End: 2019-11-08
Attending: INTERNAL MEDICINE
Payer: MEDICARE

## 2019-11-08 PROCEDURE — 97112 NEUROMUSCULAR REEDUCATION: CPT

## 2019-11-08 PROCEDURE — 97140 MANUAL THERAPY 1/> REGIONS: CPT

## 2019-11-08 PROCEDURE — 97110 THERAPEUTIC EXERCISES: CPT

## 2019-11-08 NOTE — PROGRESS NOTES
Dx:     Spondylolisthesis at L4-L5 level (M43.16)     Insurance (Authorized # of Visits):  Medicare BCBS PPO; 12 visits; cert ends 7/01/6242       Authorizing Physician: Dr. Narcisa Camp  Next MD visit: none scheduled  Fall Risk: elevated         Precautions: h with signs of hip drop and will continue to benefit from hip strengthening to ease gait and transfers. Goals: (to be met in 12 visits)   1.  Patient will be independent with HEP to optimize gains made in PT to home and community settings and for self man min  Harmonic oscillation B hips x4 min Manual Therapy  Sidelying: MFR lumbar paraspinals x 4 min  Supine: PROM B hips; PROM R knee x 4 min  Harmonic oscillation B hips x4 min   Neuro Re-ed  Diaphragmatic breathing in sitting x5 min  Sitting: HS curls with

## 2019-11-12 ENCOUNTER — OFFICE VISIT (OUTPATIENT)
Dept: PHYSICAL THERAPY | Age: 69
End: 2019-11-12
Attending: PHYSICAL MEDICINE & REHABILITATION
Payer: MEDICARE

## 2019-11-12 DIAGNOSIS — M43.16 SPONDYLOLISTHESIS AT L4-L5 LEVEL: ICD-10-CM

## 2019-11-12 PROCEDURE — 97140 MANUAL THERAPY 1/> REGIONS: CPT

## 2019-11-12 PROCEDURE — 97110 THERAPEUTIC EXERCISES: CPT

## 2019-11-12 RX ORDER — DOXEPIN HYDROCHLORIDE 10 MG/1
10 CAPSULE ORAL NIGHTLY
Qty: 30 CAPSULE | Refills: 0 | OUTPATIENT
Start: 2019-11-12

## 2019-11-12 NOTE — TELEPHONE ENCOUNTER
Decline refill to Doxepin because patient is not taking medication anymore. Spoke to patient state that there is still pain in the lower back going down the legs to the knees. The steroids that was prescribed did make it better. Still doing therapy and believe it is helpful but still painful on the right side.

## 2019-11-12 NOTE — PROGRESS NOTES
Progress Summary  Pt has attended 10 visits in Physical Therapy.    Dx:     Spondylolisthesis at L4-L5 level (M43.16)     Insurance (Authorized # of Visits):  Medicare BCBS PPO; 12 visits; cert ends 4/79/0236       Authorizing Physician: Dr. Gustavo Bradley 5/5  Great Toe Ext (L5): R 5/5, L 5/5  PF (S1): R 5/5; L 5/5 Hip flexion (L2): R 4 -/5 *; L 4+/5  Hip abduction: R 4/5; L 5/5  Hip Extension: R 3/5; L 3+/5   Hip ER: R 5/5; L 5/5  Hip IR: R 5/5; L 5/5  Knee Flexion: R 4/5; L 5/5   Knee extension (L3): R 5/ demonstrate normalized gait mechanics with sufficient hip extension and good foot clearance to reduce risk for falls. (In progress)  11/12/2019: Goals reviewed with pt.  All goals are in working progress    FOTO:   Intake 52%  Predicted 60%  11/12/2019 60% lumbar paraspinals x 5 min  Supine: PROM B hips; PROM R knee x 4 min  Harmonic oscillation B hips x 15 min (including education provided to pt's  with return demo)   Neuro Re-ed  Lateral steppin'x 2 laps  Monster walk fwd; 20'x 2 laps  March on

## 2019-11-15 ENCOUNTER — OFFICE VISIT (OUTPATIENT)
Dept: PHYSICAL THERAPY | Age: 69
End: 2019-11-15
Attending: PHYSICAL MEDICINE & REHABILITATION
Payer: MEDICARE

## 2019-11-15 PROCEDURE — 97140 MANUAL THERAPY 1/> REGIONS: CPT

## 2019-11-15 PROCEDURE — 97110 THERAPEUTIC EXERCISES: CPT

## 2019-11-15 NOTE — PROGRESS NOTES
Progress Summary  Pt has attended 10 visits in Physical Therapy.    Dx:     Spondylolisthesis at L4-L5 level (M43.16)     Insurance (Authorized # of Visits):  Medicare BCBS PPO; 18 visits; cert ends 3/93/5666       Authorizing Physician: Dr. Vipul Mason  Next exercises by pacing. Goals: (to be met in 12 visits)   1. Patient will be independent with HEP to optimize gains made in PT to home and community settings and for self management of prophylactic care. (In progress)  2.  Patient will demonstrate good po paraspinals x 5 min  Supine: PROM B hips; PROM R knee x 4 min  Harmonic oscillation B hips x 15 min (including education provided to pt's  with return demo) Manual Therapy  Sidelying: MFR lumbar paraspinals x 5 min  Supine: PROM B hips; PROM R knee

## 2019-11-19 ENCOUNTER — APPOINTMENT (OUTPATIENT)
Dept: PHYSICAL THERAPY | Age: 69
End: 2019-11-19
Payer: MEDICARE

## 2019-11-20 ENCOUNTER — OFFICE VISIT (OUTPATIENT)
Dept: SURGERY | Facility: CLINIC | Age: 69
End: 2019-11-20

## 2019-11-20 DIAGNOSIS — M43.16 SPONDYLOLISTHESIS AT L4-L5 LEVEL: Primary | ICD-10-CM

## 2019-11-20 PROCEDURE — 64483 NJX AA&/STRD TFRM EPI L/S 1: CPT | Performed by: PHYSICAL MEDICINE & REHABILITATION

## 2019-11-20 NOTE — PROCEDURES
[facility]    BILATERAL LUMBAR TRANSFORAMINAL   NAME:  Dhara Carvalho    MR #:    [id number] :  1950     PHYSICIAN:  Иван Blancas        Operative Report    DATE OF PROCEDURE: 2019   PREOPERATIVE DIAGNOSES: 1.  Spondy lidocaine without epinephrine. Then, a 5 inch, 22-gauge spinal needle was inserted and directed towards the left L4-5 intervertebral foramen.   When it felt to be in good position, AP fluoroscopy was used to advance the needle to the 6 o'clock position on

## 2019-11-22 ENCOUNTER — APPOINTMENT (OUTPATIENT)
Dept: PHYSICAL THERAPY | Age: 69
End: 2019-11-22
Payer: MEDICARE

## 2019-12-04 ENCOUNTER — OFFICE VISIT (OUTPATIENT)
Dept: PHYSICAL THERAPY | Age: 69
End: 2019-12-04
Attending: PHYSICAL MEDICINE & REHABILITATION
Payer: MEDICARE

## 2019-12-04 PROCEDURE — 97110 THERAPEUTIC EXERCISES: CPT

## 2019-12-04 PROCEDURE — 97140 MANUAL THERAPY 1/> REGIONS: CPT

## 2019-12-04 NOTE — PROGRESS NOTES
Dx:     Spondylolisthesis at L4-L5 level (M43.16)     Insurance (Authorized # of Visits):  Medicare BCBS PPO; 18 visits; cert ends 3/10/5638       Authorizing Physician: Dr. Chip Bradley MD visit: 12/19/2019 (injection with Dr. Chip Farias)  Fall Risk: Lawrance Holter today as pt has a foampad at home to progress her balance exercises. Some improvements in strength, but weakness still present at R hip and R knee. Goals: (to be met in 12 visits)   1.  Patient will be independent with HEP to optimize gains made in PT to Therapy  Sidelying: MFR lumbar paraspinals x 5 min  Supine: PROM B hips; PROM R knee x 4 min  Harmonic oscillation B hips x 15 min (including education provided to pt's  with return demo) Manual Therapy  Sidelying: MFR lumbar paraspinals x 5 min  Huratdo

## 2019-12-10 ENCOUNTER — OFFICE VISIT (OUTPATIENT)
Dept: PHYSICAL THERAPY | Age: 69
End: 2019-12-10
Attending: INTERNAL MEDICINE
Payer: MEDICARE

## 2019-12-10 PROCEDURE — 97112 NEUROMUSCULAR REEDUCATION: CPT

## 2019-12-10 PROCEDURE — 97140 MANUAL THERAPY 1/> REGIONS: CPT

## 2019-12-10 NOTE — PROGRESS NOTES
Dx:     Spondylolisthesis at L4-L5 level (M43.16)     Insurance (Authorized # of Visits):  Medicare BCBS PPO; 18 visits; cert ends 4/93/1223       Authorizing Physician: Dr. Sylvia Bradley MD visit: 12/19/2019 (injection with Dr. Sylvia Wilson)  Fall Risk: Vianey Ya - 10*;  - 8     Knee ROM (12/10/2019): (*denotes performed with pain)  Flex: R 122* (pre-tx)/127 (post-tx); L 135  Ext: R - 8*; L  - 4    Palpation: TTP at R greater trochanter      Assessment: Addressed stiffness at R hip and R knee with improved R knee R add ball 20x  Sitting: hip abd tband 20x  Step ups stairs: 10xB Therapeutic Exercise  SKTC stretch; 10x5\", B  Step ups stairs; 10xB   Manual Therapy  Sidelying: MFR lumbar paraspinals x 5 min  Supine: PROM B hips; PROM R knee x 4 min  Harmonic oscillation

## 2019-12-13 ENCOUNTER — APPOINTMENT (OUTPATIENT)
Dept: PHYSICAL THERAPY | Age: 69
End: 2019-12-13
Payer: MEDICARE

## 2019-12-18 ENCOUNTER — OFFICE VISIT (OUTPATIENT)
Dept: PHYSICAL THERAPY | Age: 69
End: 2019-12-18
Attending: PHYSICAL MEDICINE & REHABILITATION
Payer: MEDICARE

## 2019-12-18 PROCEDURE — 97112 NEUROMUSCULAR REEDUCATION: CPT

## 2019-12-18 PROCEDURE — 97140 MANUAL THERAPY 1/> REGIONS: CPT

## 2019-12-18 NOTE — PROGRESS NOTES
Discharge Summary  Pt has attended 12 visits in Physical Therapy.      Dx:     Spondylolisthesis at L4-L5 level (M43.16)     Insurance (Authorized # of Visits):  Medicare BCBS PPO; 18 visits; cert ends 3/56/6674       Authorizing Physician: Dr. Satinder Asif EMERGENCY DEPARTMENT HISTORY AND PHYSICAL EXAM 
 
8:22 AM 
 
 
Date: 9/26/2018 Patient Name: Geena Gutiérrez History of Presenting Illness Chief Complaint Patient presents with  
 Other History Provided By: Patient and patient's daughter Chief Complaint: Rossy  Duration: Unknown Timing:  N/A Location: N/A Quality: N/A Severity: N/A Modifying Factors: N/A Associated Symptoms: Dysuria and bilateral leg weakness. Additional History (Context): Geena Gutiérrez is a 69 y.o. male with a history of Parkinson's disease who presents with a fall with an unknown down time. The patient's daughter states she found him on the bathroom floor this morning. The patient's son had saw the patient yesterday evening and he was normal. Daughter does not know if the patient hit his head. The daughter states prior to arrival he was having episodes in which he was not orientated to place. The patient reports dysuria. He states he lost control of his legs. Daughter states the patient has been refusing to see a PCP and that he is not well hydrated. History limited due to altered mental status. PCP: None Past History Past Medical History: 
Past Medical History:  
Diagnosis Date  Enlarged prostate  Nocturia  Parkinson's disease ([address]  Vitamin D deficiency Past Surgical History: 
History reviewed. No pertinent surgical history. Family History: 
History reviewed. No pertinent family history. Social History: 
Social History Substance Use Topics  Smoking status: Former Smoker  Smokeless tobacco: Former User  Alcohol use No  
 
 
Allergies: 
No Known Allergies Review of Systems Review of Systems Unable to perform ROS: Mental status change Physical Exam  
 
Visit Vitals  /68  Pulse 66  Temp 97.5 °F (36.4 °C)  Resp 14  
 Ht 5' 11\" (1.803 m)  Wt 65.8 kg (145 lb)  SpO2 100%  BMI 20.22 kg/m2 Physical Exam  
 Constitutional: He is oriented to person, place, and time. He appears well-developed and well-nourished. He appears cachectic. No distress. HENT:  
Head: Normocephalic and atraumatic. Mouth/Throat: Oropharynx is clear and moist. Mucous membranes are dry. Eyes: Conjunctivae and EOM are normal. Pupils are equal, round, and reactive to light. No scleral icterus. Neck: Normal range of motion. Neck supple. Cardiovascular: Normal rate, regular rhythm and normal heart sounds. No murmur heard. Pulmonary/Chest: Effort normal and breath sounds normal. No respiratory distress. Abdominal: Soft. Bowel sounds are normal. He exhibits no distension. There is no tenderness. Musculoskeletal: He exhibits no edema. Lymphadenopathy:  
  He has no cervical adenopathy. Neurological: He is alert and oriented to person, place, and time. Coordination normal.  
Parkinsonian tremors. Skin: Skin is warm and dry. No rash noted. Abrasion right orbit. Stage 1 ulceration left hip. Psychiatric: He has a normal mood and affect. His behavior is normal.  
Nursing note and vitals reviewed. Diagnostic Study Results Labs - Recent Results (from the past 12 hour(s)) URINALYSIS W/ RFLX MICROSCOPIC Collection Time: 09/26/18  8:10 AM  
Result Value Ref Range Color YELLOW Appearance CLOUDY Specific gravity 1.016 1.005 - 1.030    
 pH (UA) 5.5 5.0 - 8.0 Protein 100 (A) NEG mg/dL Glucose NEGATIVE  NEG mg/dL Ketone 15 (A) NEG mg/dL Bilirubin NEGATIVE  NEG Blood MODERATE (A) NEG Urobilinogen 0.2 0.2 - 1.0 EU/dL Nitrites NEGATIVE  NEG Leukocyte Esterase LARGE (A) NEG URINE MICROSCOPIC ONLY Collection Time: 09/26/18  8:10 AM  
Result Value Ref Range WBC TOO NUMEROUS TO COUNT 0 - 4 /hpf  
 RBC 36 to 50 0 - 5 /hpf Epithelial cells 4+ 0 - 5 /lpf Bacteria 4+ (A) NEG /hpf  
EKG, 12 LEAD, INITIAL Collection Time: 09/26/18  8:17 AM  
Result Value Ref Range abduction: R 4 -/5*; L 4+/5  Hip Extension: R 3 -/5; L 3/5   Hip ER: R 4 -/5; L 5/5  Hip IR: R 5/5; L 5/5  Knee Flexion: R 3+/5*; L 5/5   Knee extension (L3): R 5/5; L 5/5   DF (L4): R 5/5; L 5/5  Great Toe Ext (L5): R 5/5, L 5/5  PF (S1): R 5/5; L 5/5 Hip Ventricular Rate 67 BPM  
 Atrial Rate 66 BPM  
 P-R Interval 152 ms QRS Duration 94 ms Q-T Interval 426 ms  
 QTC Calculation (Bezet) 450 ms Calculated P Axis 91 degrees Calculated R Axis 82 degrees Calculated T Axis 64 degrees Diagnosis Sinus rhythm No previous ECGs available Confirmed by Dandy Lu ([id number]) on 9/26/2018 10:27:13 AM 
  
CBC WITH AUTOMATED DIFF Collection Time: 09/26/18  8:20 AM  
Result Value Ref Range WBC 9.2 4.6 - 13.2 K/uL  
 RBC 3.81 (L) 4.70 - 5.50 M/uL  
 HGB 12.2 (L) 13.0 - 16.0 g/dL HCT 35.6 (L) 36.0 - 48.0 % MCV 93.4 74.0 - 97.0 FL  
 MCH 32.0 24.0 - 34.0 PG  
 MCHC 34.3 31.0 - 37.0 g/dL  
 RDW 13.0 11.6 - 14.5 % PLATELET 352 986 - 113 K/uL MPV 9.3 9.2 - 11.8 FL  
 NEUTROPHILS 80 (H) 40 - 73 % LYMPHOCYTES 12 (L) 21 - 52 % MONOCYTES 8 3 - 10 % EOSINOPHILS 0 0 - 5 % BASOPHILS 0 0 - 2 %  
 ABS. NEUTROPHILS 7.3 1.8 - 8.0 K/UL  
 ABS. LYMPHOCYTES 1.1 0.9 - 3.6 K/UL  
 ABS. MONOCYTES 0.8 0.05 - 1.2 K/UL  
 ABS. EOSINOPHILS 0.0 0.0 - 0.4 K/UL  
 ABS. BASOPHILS 0.0 0.0 - 0.1 K/UL  
 DF AUTOMATED METABOLIC PANEL, COMPREHENSIVE Collection Time: 09/26/18  8:20 AM  
Result Value Ref Range Sodium 141 136 - 145 mmol/L Potassium 3.3 (L) 3.5 - 5.5 mmol/L Chloride 104 100 - 108 mmol/L  
 CO2 26 21 - 32 mmol/L Anion gap 11 3.0 - 18 mmol/L Glucose 101 (H) 74 - 99 mg/dL BUN 37 (H) 7.0 - 18 MG/DL Creatinine 1.13 0.6 - 1.3 MG/DL  
 BUN/Creatinine ratio 33 (H) 12 - 20 GFR est AA >60 >60 ml/min/1.73m2 GFR est non-AA >60 >60 ml/min/1.73m2 Calcium 9.0 8.5 - 10.1 MG/DL Bilirubin, total 0.8 0.2 - 1.0 MG/DL  
 ALT (SGPT) 22 16 - 61 U/L  
 AST (SGOT) 36 15 - 37 U/L Alk. phosphatase 90 45 - 117 U/L Protein, total 8.2 6.4 - 8.2 g/dL Albumin 3.7 3.4 - 5.0 g/dL Globulin 4.5 (H) 2.0 - 4.0 g/dL A-G Ratio 0.8 0.8 - 1.7 LIPASE Collection Time: 09/26/18  8:20 AM  
Result Value Ref Range advised to follow up with Dr. Bakari Barbosa and will be discharged from PT at this time. Since deficits still remain she may benefit from PT again in the future. Goals: (to be met in 12 visits)   1.  Patient will be independent with HEP to optimize gains mad 11/15/2019  Tx#: 11/18 Date: 12/4/2019  Tx#: 12/18 Date: 12/10/2019  Tx#: 13/18 Date: 12/18/2019  Tc#: 14/18   Therapeutic Exercise  Supine: LTR 10x2  Supine: BKFOs 10x2  Supine: piriformis stretch 20\"x5  Supine: hip add ball 20x  Supine; hip abd tband 20 Lipase 64 (L) 73 - 393 U/L  
CARDIAC PANEL,(CK, CKMB & TROPONIN) Collection Time: 09/26/18  8:20 AM  
Result Value Ref Range  (H) 39 - 308 U/L  
 CK - MB 17.9 (H) <3.6 ng/ml CK-MB Index 2.5 0.0 - 4.0 % Troponin-I, Qt. <0.02 0.0 - 0.045 NG/ML  
MAGNESIUM Collection Time: 09/26/18  8:20 AM  
Result Value Ref Range Magnesium 2.2 1.6 - 2.6 mg/dL CULTURE, BLOOD Collection Time: 09/26/18  8:30 AM  
Result Value Ref Range Special Requests: PERIPHERAL Culture result: PENDING   
POC LACTIC ACID Collection Time: 09/26/18  8:33 AM  
Result Value Ref Range Lactic Acid (POC) 2.1 (HH) 0.4 - 2.0 mmol/L  
CULTURE, BLOOD Collection Time: 09/26/18  8:40 AM  
Result Value Ref Range Special Requests: PERIPHERAL Culture result: PENDING Radiologic Studies -  
CT HEAD WO CONT Final Result CT HEAD: 
 
IMPRESSION: 1.  White matter findings nonspecific but typically reflecting minor chronic 
ischemic change in a patient of this age. Slight parenchymal volume 
loss/atrophy.   
 
Please note that head CT may be negative in the acute setting and MRI could best 
further evaluate for acute/subacute ischemia/infarct in the high/persistent 
index of clinical suspicion. Medical Decision Making I am the first provider for this patient. I reviewed the vital signs, available nursing notes, past medical history, past surgical history, family history and social history. Vital Signs-Reviewed the patient's vital signs. Pulse Oximetry Analysis -  98 on room air (Interpretation) Cardiac Monitor: 
Rate: 66 Rhythm:  Normal Sinus Rhythm EKG: Interpreted by the EP. Time Interpreted: 3440 Rate: nsr 67 Rhythm: Normal Sinus Rhythm Interpretation:nonspecific st changes Comparison: none Records Reviewed: Nursing Notes and Old Medical Records (Time of Review: 8:22 AM) 
 
ED Course: Progress Notes, Reevaluation, and Consults: stable in ED fluids and abx started 11:00 AM Consult: I discussed care with Dr. Nora Tran (Hospitaist). It was a standard discussion including patient history, chief complaint, available diagnostic results, and predicted treatment course. Accepts admission. Provider Notes (Medical Decision Making): MDM Number of Diagnoses or Management Options Acute UTI:  
Elevated CPK:  
Elevated lactic acid level:  
Diagnosis management comments: H/o parkinsons disease lost to medical follow up lives alone last time seen was last night found in bathroom this am incontinent of stool with urinary retention appears dehydrated poor hygiene pt was unable to ambulate or lift himself off of the ground Dobbins placed  With 1 liter of urine retained ua cultured rocephin started, fluids started in ED labs and ct reviewed Amount and/or Complexity of Data Reviewed Clinical lab tests: ordered and reviewed Tests in the radiology section of CPT®: ordered and reviewed Diagnosis Clinical Impression: 1. Acute UTI 2. Elevated CPK 3. Elevated lactic acid level Disposition: admit Follow-up Information None Patient's Medications No medications on file  
 
_______________________________ Attestations: 
Scribe Attestation Genet Wiley acting as a scribe for and in the presence of Lakhwinder Khoury MD     
September 26, 2018 at 10:39 AM 
    
Provider Attestation:     
I personally performed the services described in the documentation, reviewed the documentation, as recorded by the scribe in my presence, and it accurately and completely records my words and actions. September 26, 2018 at 10:39 AM - Lakhwinder Khoury MD   
_______________________________

## 2019-12-19 ENCOUNTER — MED REC SCAN ONLY (OUTPATIENT)
Dept: NEUROLOGY | Facility: CLINIC | Age: 69
End: 2019-12-19

## 2019-12-19 ENCOUNTER — OFFICE VISIT (OUTPATIENT)
Dept: NEUROLOGY | Facility: CLINIC | Age: 69
End: 2019-12-19
Payer: MEDICARE

## 2019-12-19 VITALS
HEIGHT: 61 IN | HEART RATE: 78 BPM | BODY MASS INDEX: 36.82 KG/M2 | RESPIRATION RATE: 18 BRPM | SYSTOLIC BLOOD PRESSURE: 122 MMHG | DIASTOLIC BLOOD PRESSURE: 78 MMHG | WEIGHT: 195 LBS

## 2019-12-19 DIAGNOSIS — M48.061 SPINAL STENOSIS AT L4-L5 LEVEL: Primary | ICD-10-CM

## 2019-12-19 DIAGNOSIS — M17.0 BILATERAL PRIMARY OSTEOARTHRITIS OF KNEE: ICD-10-CM

## 2019-12-19 DIAGNOSIS — M16.11 PRIMARY OSTEOARTHRITIS OF RIGHT HIP: ICD-10-CM

## 2019-12-19 PROCEDURE — 99214 OFFICE O/P EST MOD 30 MIN: CPT | Performed by: PHYSICAL MEDICINE & REHABILITATION

## 2019-12-19 RX ORDER — CELECOXIB 100 MG/1
100 CAPSULE ORAL 2 TIMES DAILY
Qty: 60 CAPSULE | Refills: 2 | Status: SHIPPED | OUTPATIENT
Start: 2019-12-19 | End: 2020-03-09

## 2019-12-19 NOTE — PATIENT INSTRUCTIONS
Call me in 2 weeks and let me know how you are doing. Call if you have problems getting the celebrex.

## 2019-12-19 NOTE — PROGRESS NOTES
HPI:    Patient ID: Beltran Ludwig is a 77year old female.     This is a 31-year-old female who presents for 4-week follow-up after bilateral L4 transforaminal epidural steroid injection under fluoroscopy, local.  She had 3 days of complete pain relief, 80 BY MOUTH ONE TIME DAILY  90 tablet 2   • LEVOTHYROXINE SODIUM 100 MCG Oral Tab TAKE ONE TABLET BY MOUTH ONE TIME DAILY  90 tablet 2   • Ergocalciferol (VITAMIN D OR) Take by mouth. • Glucosamine HCl (GLUCOSAMINE OR) Take by mouth.      • Multiple Vitami of right hip  Spinal stenosis at l4-l5 level  (primary encounter diagnosis)    I am still convinced that the majority of her problems are due to the stenosis and spondylolisthesis. We will have her switch from ibuprofen to Celebrex.   She will contact me i

## 2019-12-20 ENCOUNTER — APPOINTMENT (OUTPATIENT)
Dept: PHYSICAL THERAPY | Age: 69
End: 2019-12-20
Payer: MEDICARE

## 2020-01-02 ENCOUNTER — TELEPHONE (OUTPATIENT)
Dept: NEUROLOGY | Facility: CLINIC | Age: 70
End: 2020-01-02

## 2020-01-02 DIAGNOSIS — M48.061 SPINAL STENOSIS AT L4-L5 LEVEL: ICD-10-CM

## 2020-01-02 DIAGNOSIS — M43.16 SPONDYLOLISTHESIS AT L4-L5 LEVEL: Primary | ICD-10-CM

## 2020-01-02 NOTE — TELEPHONE ENCOUNTER
Medicare Online for insurance coverage of bilateral L4 transforaminal epidural steroid injection cpt code [id number], [id number]. Insurance was verified and procedure is a covered benefit and does not require authorization. Will inform Nursing.

## 2020-01-02 NOTE — TELEPHONE ENCOUNTER
Patient has been scheduled for bilateral L4 transforaminal epidural steroid injection   on 01/08/20 at the [facility]. Medications and allergies reviewed.  Patient informed to hold aspirins, nsaids, blood thinners, multivitamins, vitamin E and fish oils 3-7 days

## 2020-01-02 NOTE — TELEPHONE ENCOUNTER
Spoke to patient state that she been taking celebrex up till 1/1/20. Did not help. Leg pain is increased, palpitation, diarrhea and indigestion. Not feeling it did anything, leg pain is increased in the hip. Can't walk more than a block.  Doing therapy exer

## 2020-01-08 ENCOUNTER — OFFICE VISIT (OUTPATIENT)
Dept: SURGERY | Facility: CLINIC | Age: 70
End: 2020-01-08

## 2020-01-08 DIAGNOSIS — M43.16 SPONDYLOLISTHESIS OF LUMBAR REGION: Primary | ICD-10-CM

## 2020-01-08 DIAGNOSIS — M48.061 SPINAL STENOSIS AT L4-L5 LEVEL: ICD-10-CM

## 2020-01-08 PROCEDURE — 64483 NJX AA&/STRD TFRM EPI L/S 1: CPT | Performed by: PHYSICAL MEDICINE & REHABILITATION

## 2020-01-08 NOTE — PROCEDURES
[facility].    BILATERAL LUMBAR TRANSFORAMINAL   NAME:  Tommie Edmonds    MR #:    [id number] :  1950     PHYSICIAN:  Dana Souza        Operative Report    DATE OF PROCEDURE: 2020   PREOPERATIVE DIAGNOSES: 1.  Spondylo point in time, the patient's skin was anesthetized with 1 to 2 cc of 1% PF lidocaine without epinephrine. Then, a 5 inch, 22-gauge spinal needle was inserted and directed towards the left L4-5 intervertebral foramen.   When it felt to be in good position,

## 2020-01-17 ENCOUNTER — OFFICE VISIT (OUTPATIENT)
Dept: DERMATOLOGY CLINIC | Facility: CLINIC | Age: 70
End: 2020-01-17
Payer: MEDICARE

## 2020-01-17 DIAGNOSIS — H61.003: ICD-10-CM

## 2020-01-17 DIAGNOSIS — L82.1 SEBORRHEIC KERATOSES: Primary | ICD-10-CM

## 2020-01-17 DIAGNOSIS — D22.9 MULTIPLE NEVI: ICD-10-CM

## 2020-01-17 DIAGNOSIS — D23.5 BENIGN NEOPLASM OF SKIN OF TRUNK, EXCEPT SCROTUM: ICD-10-CM

## 2020-01-17 DIAGNOSIS — D23.4 BENIGN NEOPLASM OF SCALP AND SKIN OF NECK: ICD-10-CM

## 2020-01-17 DIAGNOSIS — D23.60 BENIGN NEOPLASM OF SKIN OF UPPER LIMB, INCLUDING SHOULDER, UNSPECIFIED LATERALITY: ICD-10-CM

## 2020-01-17 DIAGNOSIS — D23.30 BENIGN NEOPLASM OF SKIN OF FACE: ICD-10-CM

## 2020-01-17 PROCEDURE — 99213 OFFICE O/P EST LOW 20 MIN: CPT | Performed by: DERMATOLOGY

## 2020-01-17 PROCEDURE — G0463 HOSPITAL OUTPT CLINIC VISIT: HCPCS | Performed by: DERMATOLOGY

## 2020-01-27 NOTE — PROGRESS NOTES
Derick Piña is a 77year old female. HPI:     CC:  Patient presents with:  Full Skin Exam: LOV 10/15/2018 Patient present for full body skin exam . Patient denies any hx of sc        Allergies:  Patient has no known allergies.     HISTORY:    Past Medic COLONOSCOPY  07-   • D & C       Social History    Socioeconomic History      Marital status:       Spouse name: Not on file      Number of children: Not on file      Years of education: Not on file      Highest education level: Not on file Not Asked        Sleep Concern: Not Asked        Stress Concern: Not Asked        Weight Concern: Not Asked        Special Diet: Not Asked        Back Care: Not Asked        Exercise: No        Bike Helmet: Not Asked        Seat Belt: Not Asked        Self cherry-red vascular papules scattered. See map today's date for lesions noted . Otherwise remarkable for lesions as noted on map.   See details of examination  See Assessment /Plan for additional history and physical exam also:    Assessment / plan: p.r.n. The patient indicates understanding of these issues and agrees to the plan. The patient is asked to return as noted in follow-up/ above. This note was generated using Dragon voice recognition software.   Please contact me regarding any confusi

## 2020-02-04 ENCOUNTER — TELEPHONE (OUTPATIENT)
Dept: NEUROLOGY | Facility: CLINIC | Age: 70
End: 2020-02-04

## 2020-02-04 ENCOUNTER — HOSPITAL ENCOUNTER (OUTPATIENT)
Dept: GENERAL RADIOLOGY | Facility: HOSPITAL | Age: 70
Discharge: HOME | End: 2020-02-04
Attending: PHYSICAL MEDICINE & REHABILITATION
Payer: MEDICARE

## 2020-02-04 ENCOUNTER — OFFICE VISIT (OUTPATIENT)
Dept: NEUROLOGY | Facility: CLINIC | Age: 70
End: 2020-02-04
Payer: MEDICARE

## 2020-02-04 VITALS
SYSTOLIC BLOOD PRESSURE: 128 MMHG | BODY MASS INDEX: 36.82 KG/M2 | WEIGHT: 195 LBS | RESPIRATION RATE: 16 BRPM | HEIGHT: 61 IN | DIASTOLIC BLOOD PRESSURE: 68 MMHG | HEART RATE: 76 BPM

## 2020-02-04 DIAGNOSIS — M17.0 BILATERAL PRIMARY OSTEOARTHRITIS OF KNEE: ICD-10-CM

## 2020-02-04 DIAGNOSIS — M17.0 BILATERAL PRIMARY OSTEOARTHRITIS OF KNEE: Primary | ICD-10-CM

## 2020-02-04 PROCEDURE — 99214 OFFICE O/P EST MOD 30 MIN: CPT | Performed by: PHYSICAL MEDICINE & REHABILITATION

## 2020-02-04 PROCEDURE — 73564 X-RAY EXAM KNEE 4 OR MORE: CPT | Performed by: PHYSICAL MEDICINE & REHABILITATION

## 2020-02-04 RX ORDER — NABUMETONE 750 MG/1
750 TABLET, FILM COATED ORAL 2 TIMES DAILY
Qty: 14 TABLET | Refills: 0 | Status: SHIPPED | OUTPATIENT
Start: 2020-02-04 | End: 2020-05-11

## 2020-02-04 RX ORDER — INDOMETHACIN 50 MG/1
50 CAPSULE ORAL 2 TIMES DAILY WITH MEALS
Qty: 14 CAPSULE | Refills: 0 | Status: SHIPPED | OUTPATIENT
Start: 2020-02-04 | End: 2020-02-04

## 2020-02-04 NOTE — PROGRESS NOTES
HPI:    Patient ID: Jose Burns is a 77year old female. 77year old female presents for follow up.  Reports 60-70 percent relief of radicular back pain that only lasted for 2 weeks following bilateral L4 transforaminal epidural steroid injection done TABLET BY MOUTH ONE TIME DAILY  90 tablet 2   • LEVOTHYROXINE SODIUM 100 MCG Oral Tab TAKE ONE TABLET BY MOUTH ONE TIME DAILY  90 tablet 2   • Ergocalciferol (VITAMIN D OR) Take by mouth. • Glucosamine HCl (GLUCOSAMINE OR) Take by mouth.      • Multiple

## 2020-02-04 NOTE — TELEPHONE ENCOUNTER
Medicare Online for insurance coverage of MRI left and right knee wo cpt code 13826. Insurance was verified and procedure is a covered benefit and they do not require authorization. Will call Pt. To inform. Pt. Informed no authorization is required.  Trans

## 2020-02-04 NOTE — TELEPHONE ENCOUNTER
[place] pharmacy called state insurance is not covering the indomethacin and is asking to change the medication to Naproxen or nabumetone. Please advise.

## 2020-02-05 ENCOUNTER — TELEPHONE (OUTPATIENT)
Dept: NEUROLOGY | Facility: CLINIC | Age: 70
End: 2020-02-05

## 2020-02-05 ENCOUNTER — PATIENT MESSAGE (OUTPATIENT)
Dept: NEUROLOGY | Facility: CLINIC | Age: 70
End: 2020-02-05

## 2020-02-05 ENCOUNTER — MED REC SCAN ONLY (OUTPATIENT)
Dept: NEUROLOGY | Facility: CLINIC | Age: 70
End: 2020-02-05

## 2020-02-05 NOTE — TELEPHONE ENCOUNTER
From: Andres Castillo  To: Meliza Garcia DO  Sent: 2/5/2020 5:18 PM CST  Subject: Test Results Question    Danny Lester -   Can you mail it? Thanks. Joyce Rosario  [address]

## 2020-02-05 NOTE — TELEPHONE ENCOUNTER
Notified patient about Xray's of bilateral knees. Patient is asking how it is only Pseudogout in the left knee and not the right knee. The left knee is not as swollen and the right knee is still swollen and painful.    Please advise

## 2020-02-05 NOTE — TELEPHONE ENCOUNTER
Pseudogout can be in one joint only and there are other causes of knee pain, such as osteoarthritis or meniscal tears. Proceed with MRI of the knees.

## 2020-02-05 NOTE — TELEPHONE ENCOUNTER
----- Message from Herson Jaquez DO sent at 2/4/2020  5:06 PM CST -----  Xrays similar to last year - arthritis in both knees, right worse than left, with pseudogout seen in the left knee only. Proceed with MRI of both knees as ordered.

## 2020-02-06 ENCOUNTER — TELEPHONE (OUTPATIENT)
Dept: NEUROLOGY | Facility: CLINIC | Age: 70
End: 2020-02-06

## 2020-02-06 DIAGNOSIS — M17.0 BILATERAL PRIMARY OSTEOARTHRITIS OF KNEE: Primary | ICD-10-CM

## 2020-02-06 DIAGNOSIS — M43.16 SPONDYLOLISTHESIS AT L4-L5 LEVEL: ICD-10-CM

## 2020-02-06 NOTE — TELEPHONE ENCOUNTER
Regarding: Other  Contact: [phone]  ----- Message from Radha Carrasquillo MA sent at 2/6/2020  2:18 PM CST -----       ----- Message from Daya Hall to Lola Jc DO sent at 2/6/2020  2:12 PM -----   Two questions -     The Nabumetone is helpin

## 2020-02-07 ENCOUNTER — HOSPITAL ENCOUNTER (OUTPATIENT)
Dept: MRI IMAGING | Age: 70
Discharge: HOME | End: 2020-02-07
Attending: PHYSICAL MEDICINE & REHABILITATION
Payer: MEDICARE

## 2020-02-07 DIAGNOSIS — M17.0 BILATERAL PRIMARY OSTEOARTHRITIS OF KNEE: ICD-10-CM

## 2020-02-07 PROCEDURE — 73721 MRI JNT OF LWR EXTRE W/O DYE: CPT | Performed by: PHYSICAL MEDICINE & REHABILITATION

## 2020-02-10 ENCOUNTER — TELEPHONE (OUTPATIENT)
Dept: NEUROLOGY | Facility: CLINIC | Age: 70
End: 2020-02-10

## 2020-02-10 DIAGNOSIS — M17.0 BILATERAL PRIMARY OSTEOARTHRITIS OF KNEE: Primary | ICD-10-CM

## 2020-02-10 NOTE — TELEPHONE ENCOUNTER
----- Message from Adriel Arellano DO sent at 2/10/2020  1:51 PM CST -----  Please let the patient know that she has moderate to severe arthritis in both knees, and a synovial cyst in the right knee due to arthritis.  Recommend we do bilateral knee steroid i

## 2020-02-10 NOTE — TELEPHONE ENCOUNTER
Medicare Online for insurance coverage of Bilateral knee joint steroid injection under US guidance cpt codes 85838, 37402,  x 2. Insurance was verified and procedure is a covered benefit and does not require authorization. Will inform Nursing.

## 2020-02-13 ENCOUNTER — OFFICE VISIT (OUTPATIENT)
Dept: NEUROLOGY | Facility: CLINIC | Age: 70
End: 2020-02-13
Payer: MEDICARE

## 2020-02-13 ENCOUNTER — MED REC SCAN ONLY (OUTPATIENT)
Dept: NEUROLOGY | Facility: CLINIC | Age: 70
End: 2020-02-13

## 2020-02-13 VITALS — RESPIRATION RATE: 16 BRPM | BODY MASS INDEX: 36.82 KG/M2 | WEIGHT: 195 LBS | HEIGHT: 61 IN

## 2020-02-13 DIAGNOSIS — M17.0 BILATERAL PRIMARY OSTEOARTHRITIS OF KNEE: Primary | ICD-10-CM

## 2020-02-13 PROCEDURE — 20606 DRAIN/INJ JOINT/BURSA W/US: CPT | Performed by: PHYSICAL MEDICINE & REHABILITATION

## 2020-02-13 RX ORDER — LIDOCAINE HYDROCHLORIDE 10 MG/ML
3 INJECTION, SOLUTION INFILTRATION; PERINEURAL ONCE
Status: COMPLETED | OUTPATIENT
Start: 2020-02-13 | End: 2020-02-13

## 2020-02-13 RX ORDER — TRIAMCINOLONE ACETONIDE 40 MG/ML
40 INJECTION, SUSPENSION INTRA-ARTICULAR; INTRAMUSCULAR ONCE
Status: COMPLETED | OUTPATIENT
Start: 2020-02-13 | End: 2020-02-13

## 2020-02-13 NOTE — PROCEDURES
Diagnosis: Primary osteoarthritis of both knees, pseudogout left knee; synovial cyst right posterior lateral knee  Procedure: Bilateral knee joint steroid injection under ultrasound guidance    The patient is here for a bilateral knee injection done under

## 2020-02-19 ENCOUNTER — TELEPHONE (OUTPATIENT)
Dept: NEUROLOGY | Facility: CLINIC | Age: 70
End: 2020-02-19

## 2020-02-19 NOTE — TELEPHONE ENCOUNTER
LOV 2/13/20 bilateral knee steroid injection. Patient states steroid  injections helped. States feel as if right knee cyst is getting bigger again. C/o pain to right posterior knee  radiating up right thigh.

## 2020-02-19 NOTE — TELEPHONE ENCOUNTER
Spoke to patient and gave her information from Dr Matthew Price note 2/19/20. Advised will have PSR call her to schedule NOV. Expressed understanding.

## 2020-02-19 NOTE — TELEPHONE ENCOUNTER
Have her follow up and if the back of the knee is giving her discomfort we will drain the cyst and possibly injection a small dose of steroid.

## 2020-02-27 ENCOUNTER — TELEPHONE (OUTPATIENT)
Dept: NEUROLOGY | Facility: CLINIC | Age: 70
End: 2020-02-27

## 2020-02-27 ENCOUNTER — OFFICE VISIT (OUTPATIENT)
Dept: NEUROLOGY | Facility: CLINIC | Age: 70
End: 2020-02-27
Payer: MEDICARE

## 2020-02-27 DIAGNOSIS — M71.21 SYNOVIAL CYST OF RIGHT POPLITEAL SPACE: Primary | ICD-10-CM

## 2020-02-27 PROCEDURE — 20611 DRAIN/INJ JOINT/BURSA W/US: CPT | Performed by: PHYSICAL MEDICINE & REHABILITATION

## 2020-02-27 RX ORDER — LIDOCAINE HYDROCHLORIDE 10 MG/ML
1.5 INJECTION, SOLUTION INFILTRATION; PERINEURAL ONCE
Status: COMPLETED | OUTPATIENT
Start: 2020-02-27 | End: 2020-02-27

## 2020-02-27 RX ORDER — TRIAMCINOLONE ACETONIDE 40 MG/ML
20 INJECTION, SUSPENSION INTRA-ARTICULAR; INTRAMUSCULAR ONCE
Status: COMPLETED | OUTPATIENT
Start: 2020-02-27 | End: 2020-02-27

## 2020-02-27 NOTE — TELEPHONE ENCOUNTER
Medicare Online for insurance coverage of Baker's cyst aspiration and steroid injection  Cpt code 53718, [id number], . Insurance was verified and procedure is a covered benefit and does not require authorization. Dr. Ina Elizalde informed.

## 2020-02-27 NOTE — PROCEDURES
Right posterior knee cyst aspiration and injection  Indication: right posterior knee pain with an appreciable synovial cyst    Informed consent was signed. A Time out was performed with HILDA Huntley present in the room.  The cyst was identified under US

## 2020-03-03 ENCOUNTER — TELEPHONE (OUTPATIENT)
Dept: NEUROLOGY | Facility: CLINIC | Age: 70
End: 2020-03-03

## 2020-03-03 NOTE — TELEPHONE ENCOUNTER
S/w patient regarding condition update after Right knee injection on 02/27/20. Patient states she noticed improved right after injection but in the last day or two she has almost no pain.  She did order the compression knee braces that were discussed but wi

## 2020-03-09 ENCOUNTER — TELEPHONE (OUTPATIENT)
Dept: NEUROLOGY | Facility: CLINIC | Age: 70
End: 2020-03-09

## 2020-03-09 DIAGNOSIS — M17.0 BILATERAL PRIMARY OSTEOARTHRITIS OF KNEE: ICD-10-CM

## 2020-03-09 DIAGNOSIS — M16.11 PRIMARY OSTEOARTHRITIS OF RIGHT HIP: ICD-10-CM

## 2020-03-09 RX ORDER — CELECOXIB 100 MG/1
CAPSULE ORAL
Qty: 60 CAPSULE | Refills: 0 | Status: SHIPPED | OUTPATIENT
Start: 2020-03-09 | End: 2020-04-08

## 2020-03-09 NOTE — TELEPHONE ENCOUNTER
Patient calling with update from cyst removal. State since 3/4/2020 pain been increasing and been affecting the gait.  There is no swelling in the cyst area, but state she been trying to use compression stocking but they do not stay up and the pain is incre

## 2020-03-09 NOTE — TELEPHONE ENCOUNTER
Medication request: celecoxib 100 mg oral cap. Take 1 capsule by mouth 2 times daily. #60. No refills.      LOV: 2/4/2020 (2/27/2020, injection)  NOV: None    [place] 12/19/2019

## 2020-03-09 NOTE — TELEPHONE ENCOUNTER
Left a detailed message to use a ace wrap or a sleeve over the knee instead of the compression stockings. To follow up in the office in 2 weeks. If any questions to call the office back .

## 2020-04-05 ENCOUNTER — PATIENT MESSAGE (OUTPATIENT)
Dept: NEUROLOGY | Facility: CLINIC | Age: 70
End: 2020-04-05

## 2020-04-06 NOTE — TELEPHONE ENCOUNTER
Informed patient of Dr. Amanda Evans message. Patient states at this time she is going to wait on an in office appt. Patient will try Tylenol, icing and stretching. Will call back in one week with an update if she changed her mind and wants to be seen.

## 2020-04-06 NOTE — TELEPHONE ENCOUNTER
Please let the patient know if her pain is bad enough to warrant further intervention I recommend she come in this Thursday and see Dr. Anne Schaefer.  I can order SYNVISC injections and cyst aspiration ahead of time so that he can do those procedures if he thinks

## 2020-04-06 NOTE — TELEPHONE ENCOUNTER
From: Viv Kennedy  To: Yaneth Ma DO  Sent: 4/5/2020 1:18 PM CDT  Subject: Visit Follow-up Question    I have been wearing the compression sleeve for my cyst on right leg for about 4 weeks now.  Pain is getting progressively worse, resulting in more

## 2020-04-08 DIAGNOSIS — M17.0 BILATERAL PRIMARY OSTEOARTHRITIS OF KNEE: ICD-10-CM

## 2020-04-08 DIAGNOSIS — M16.11 PRIMARY OSTEOARTHRITIS OF RIGHT HIP: ICD-10-CM

## 2020-04-08 RX ORDER — CELECOXIB 100 MG/1
CAPSULE ORAL
Qty: 60 CAPSULE | Refills: 0 | Status: SHIPPED | OUTPATIENT
Start: 2020-04-08 | End: 2020-06-08

## 2020-04-08 NOTE — TELEPHONE ENCOUNTER
Refill request for celebrex 100 mg, BID, #60, no refills    LOV: 2/27/20  NOV: none  Last refilled on 3/9/20

## 2020-04-27 ENCOUNTER — VIRTUAL PHONE E/M (OUTPATIENT)
Dept: NEUROLOGY | Facility: CLINIC | Age: 70
End: 2020-04-27
Payer: MEDICARE

## 2020-04-27 DIAGNOSIS — M17.0 BILATERAL PRIMARY OSTEOARTHRITIS OF KNEE: Primary | ICD-10-CM

## 2020-04-27 PROCEDURE — 99442 PHONE E/M BY PHYS 11-20 MIN: CPT | Performed by: PHYSICAL MEDICINE & REHABILITATION

## 2020-04-27 RX ORDER — DEXAMETHASONE 1 MG
TABLET ORAL
Qty: 28 TABLET | Refills: 0 | Status: SHIPPED | OUTPATIENT
Start: 2020-04-27 | End: 2020-06-08

## 2020-04-27 NOTE — PROGRESS NOTES
Virtual Telephone Check-In    Viv Kennedy verbally consents to a Virtual/Telephone Check-In visit on 04/27/20. Patient understands and accepts financial responsibility for any deductible, co-insurance and/or co-pays associated with this service.     D was instructed to stop taking Celebrex while she was taking the Decadron. We discussed in detail that steroids by mouth can transiently decrease the immune system for a short period of time.   She has been practicing strict social distancing, neither her o

## 2020-04-27 NOTE — PATIENT INSTRUCTIONS
Please take the decadron (steroids by mouth) as follows:    Day 1:  3 tablets in the morning, 2 tablets in the afternoon, 2 tablets in the evening  Day 2:  2 tablets in the morning, 2 tablets in the afternoon, 2 tablets in the evening  Day 3:  2 tablets in

## 2020-05-04 ENCOUNTER — TELEPHONE (OUTPATIENT)
Dept: NEUROLOGY | Facility: CLINIC | Age: 70
End: 2020-05-04

## 2020-05-04 DIAGNOSIS — M19.90 ARTHRITIS: Primary | ICD-10-CM

## 2020-05-04 NOTE — TELEPHONE ENCOUNTER
Medicare Online for insurance coverage o fBilateral knee SYNVISC injections with or without ultrasound guidance cpt codes 34277, 82532(if needed),  x 2  Insurance was verified and procedure is a covered benefit and authorization is not required.  Proce

## 2020-05-07 NOTE — TELEPHONE ENCOUNTER
Should be SYNVISC one injections. Message sent to [facility] about this, the patient may have already been informed.

## 2020-05-07 NOTE — TELEPHONE ENCOUNTER
Phone call to pt  Notified of results and recommendations as below per   Pt verbalizes understanding  Transferred to schedule    ----- Message from Bay Ibarra MD sent at 10/30/2019  6:10 AM CDT -----  Chol better but still elevated.  Looks like he is due for CPE.  Will review further then.     Pt. informed injections are for Synvisc One. States she is concerned about right knee pain. States it is above the knee, Pain level 3-4/10. Wearing compression sleeve after 2 aspirations. Pain has not gone away.  Will inform Dr. Josemanuel Keyes and he can address t

## 2020-05-07 NOTE — TELEPHONE ENCOUNTER
Pt. is scheduled for Synvisc injections on 05/11/20. Pt. would like to know if she is having Synvisc One injections? States she does not want the Synvisc series of 3 injections. Will ask Dr. Camila Ya for clarification.

## 2020-05-11 ENCOUNTER — OFFICE VISIT (OUTPATIENT)
Dept: NEUROLOGY | Facility: CLINIC | Age: 70
End: 2020-05-11
Payer: MEDICARE

## 2020-05-11 VITALS — HEIGHT: 61 IN | WEIGHT: 200 LBS | BODY MASS INDEX: 37.76 KG/M2

## 2020-05-11 DIAGNOSIS — M17.0 BILATERAL PRIMARY OSTEOARTHRITIS OF KNEE: Primary | ICD-10-CM

## 2020-05-11 DIAGNOSIS — M19.90 ARTHRITIS: ICD-10-CM

## 2020-05-11 PROCEDURE — 20611 DRAIN/INJ JOINT/BURSA W/US: CPT | Performed by: PHYSICAL MEDICINE & REHABILITATION

## 2020-05-11 NOTE — PROCEDURES
Bilateral knee joint SYNVISC injection under US guidance  Dx: primary osteoarthritis of both knees    The patient is here for a bilateral knee injection done under ultrasound guidance.   Under ultrasound guidance the bilateral suprapatellar bursa was identi

## 2020-05-12 ENCOUNTER — MED REC SCAN ONLY (OUTPATIENT)
Dept: NEUROLOGY | Facility: CLINIC | Age: 70
End: 2020-05-12

## 2020-05-21 ENCOUNTER — TELEPHONE (OUTPATIENT)
Dept: NEUROLOGY | Facility: CLINIC | Age: 70
End: 2020-05-21

## 2020-05-21 ENCOUNTER — PATIENT MESSAGE (OUTPATIENT)
Dept: NEUROLOGY | Facility: CLINIC | Age: 70
End: 2020-05-21

## 2020-05-21 DIAGNOSIS — M17.0 BILATERAL PRIMARY OSTEOARTHRITIS OF KNEE: Primary | ICD-10-CM

## 2020-05-21 RX ORDER — KETOROLAC TROMETHAMINE 10 MG/1
10 TABLET, FILM COATED ORAL 2 TIMES DAILY PRN
Qty: 6 TABLET | Refills: 0 | Status: SHIPPED | OUTPATIENT
Start: 2020-05-21 | End: 2020-06-08

## 2020-05-21 RX ORDER — KETOROLAC TROMETHAMINE 10 MG/1
10 TABLET, FILM COATED ORAL DAILY
Qty: 5 TABLET | Refills: 0 | Status: SHIPPED | OUTPATIENT
Start: 2020-05-21 | End: 2020-06-08

## 2020-05-21 NOTE — TELEPHONE ENCOUNTER
Regarding: RE: Non-Urgent Medical Question  Contact: [phone]  ----- Message from Santiago Louis RN sent at 5/21/2020  9:26 AM CDT -----  Patient would like to try Ketorolac. Please send Rx to pharmacy.  Thanks.      ----- Message sent from [employer]

## 2020-06-05 DIAGNOSIS — M16.11 PRIMARY OSTEOARTHRITIS OF RIGHT HIP: ICD-10-CM

## 2020-06-05 DIAGNOSIS — M17.0 BILATERAL PRIMARY OSTEOARTHRITIS OF KNEE: ICD-10-CM

## 2020-06-08 ENCOUNTER — OFFICE VISIT (OUTPATIENT)
Dept: ORTHOPEDICS CLINIC | Facility: CLINIC | Age: 70
End: 2020-06-08
Payer: MEDICARE

## 2020-06-08 VITALS
HEART RATE: 89 BPM | WEIGHT: 200 LBS | RESPIRATION RATE: 16 BRPM | BODY MASS INDEX: 37.76 KG/M2 | OXYGEN SATURATION: 98 % | HEIGHT: 61 IN

## 2020-06-08 DIAGNOSIS — M17.0 PRIMARY OSTEOARTHRITIS OF BOTH KNEES: Primary | ICD-10-CM

## 2020-06-08 PROCEDURE — 99203 OFFICE O/P NEW LOW 30 MIN: CPT | Performed by: ORTHOPAEDIC SURGERY

## 2020-06-08 RX ORDER — CELECOXIB 100 MG/1
100 CAPSULE ORAL 2 TIMES DAILY
Qty: 60 CAPSULE | Refills: 0 | OUTPATIENT
Start: 2020-06-08

## 2020-06-08 RX ORDER — CELECOXIB 100 MG/1
CAPSULE ORAL
Qty: 60 CAPSULE | Refills: 0 | Status: SHIPPED | OUTPATIENT
Start: 2020-06-08 | End: 2020-07-06

## 2020-06-08 NOTE — H&P
[facility] New Patient Note    CC: Patient presents with:  Consult: bilateral knee pain x several years. hx AKS [id number]- no complications. synvisc injections- 2015. knee hyper extended after recieving gel injections.   swelling started in January- asp hypertension    • Hemorrhoids    • Hypothyroid    • Obesity      Past Surgical History:   Procedure Laterality Date   • ARTHROSCOPY, KNEE, SURGICAL; W/LATERAL RELEASE Bilateral    •      • COLONOSCOPY  2012   • D & C       Current Outpatient palpable mass in the posterior lateral knee near lateral hamstring tendon. Patient also has tenderness to palpation over the lateral joint line.   • Range of motion: Full extension of the knee, flexion 100 to 110 degrees  • Knee stable to varus and valgus history, and management options for symptomatic knee osteoarthritis.   I discussed nonsurgical and surgical treatments, with nonsurgical treatments to include anti-inflammatory medications, injections, activity modification, weight loss, low impact exercise

## 2020-06-08 NOTE — TELEPHONE ENCOUNTER
Medication request: Celebrex 100mg Oral Cap, #60, no refills  Take one capsule by mouth BID    [place]: 4/8/20    LOV: 5/11/20  NOV: Not yet scheduled

## 2020-07-06 DIAGNOSIS — M17.0 BILATERAL PRIMARY OSTEOARTHRITIS OF KNEE: ICD-10-CM

## 2020-07-06 DIAGNOSIS — M16.11 PRIMARY OSTEOARTHRITIS OF RIGHT HIP: ICD-10-CM

## 2020-07-06 RX ORDER — CELECOXIB 100 MG/1
100 CAPSULE ORAL 2 TIMES DAILY
Qty: 60 CAPSULE | Refills: 0 | Status: SHIPPED | OUTPATIENT
Start: 2020-07-06 | End: 2020-07-31

## 2020-07-06 NOTE — TELEPHONE ENCOUNTER
Refill request for celebrex 100 mg, BID, #60, no refills    LOV: 5/11/20  NOV: none  Last refilled on 6/2

## 2020-07-30 DIAGNOSIS — M17.0 BILATERAL PRIMARY OSTEOARTHRITIS OF KNEE: ICD-10-CM

## 2020-07-30 DIAGNOSIS — M16.11 PRIMARY OSTEOARTHRITIS OF RIGHT HIP: ICD-10-CM

## 2020-07-30 RX ORDER — CELECOXIB 100 MG/1
100 CAPSULE ORAL 2 TIMES DAILY
Qty: 60 CAPSULE | Refills: 0 | Status: CANCELLED | OUTPATIENT
Start: 2020-07-30

## 2020-07-31 RX ORDER — CELECOXIB 100 MG/1
100 CAPSULE ORAL 2 TIMES DAILY
Qty: 60 CAPSULE | Refills: 1 | Status: SHIPPED | OUTPATIENT
Start: 2020-07-31 | End: 2020-09-22

## 2020-07-31 NOTE — TELEPHONE ENCOUNTER
Medication request: Celebrex 100mg Oral Cap, #60, no refills  Take 1 capsule by mouth BID    LOV: 5/11/20  NOV: Not yet scheduled

## 2020-08-10 ENCOUNTER — OFFICE VISIT (OUTPATIENT)
Dept: NEUROLOGY | Facility: CLINIC | Age: 70
End: 2020-08-10
Payer: MEDICARE

## 2020-08-10 VITALS — RESPIRATION RATE: 15 BRPM | HEART RATE: 68 BPM | SYSTOLIC BLOOD PRESSURE: 116 MMHG | DIASTOLIC BLOOD PRESSURE: 60 MMHG

## 2020-08-10 DIAGNOSIS — M79.18 CERVICAL MYOFASCIAL PAIN SYNDROME: ICD-10-CM

## 2020-08-10 DIAGNOSIS — M17.0 BILATERAL PRIMARY OSTEOARTHRITIS OF KNEE: Primary | ICD-10-CM

## 2020-08-10 DIAGNOSIS — M48.061 SPINAL STENOSIS AT L4-L5 LEVEL: ICD-10-CM

## 2020-08-10 PROCEDURE — 99214 OFFICE O/P EST MOD 30 MIN: CPT | Performed by: PHYSICAL MEDICINE & REHABILITATION

## 2020-08-10 RX ORDER — AMOXICILLIN 500 MG
CAPSULE ORAL 2 TIMES DAILY
COMMUNITY
Start: 2020-08-01

## 2020-08-10 RX ORDER — DULOXETIN HYDROCHLORIDE 30 MG/1
30 CAPSULE, DELAYED RELEASE ORAL DAILY
Qty: 30 CAPSULE | Refills: 0 | Status: SHIPPED | OUTPATIENT
Start: 2020-08-10 | End: 2020-08-31

## 2020-08-10 RX ORDER — TIZANIDINE 2 MG/1
2 TABLET ORAL 2 TIMES DAILY PRN
Qty: 20 TABLET | Refills: 0 | Status: SHIPPED | OUTPATIENT
Start: 2020-08-10 | End: 2020-09-01

## 2020-08-10 NOTE — PATIENT INSTRUCTIONS
Please contact our office if your physician has ordered injection/procedure/test to check authorization status, if no response in 72 business hours. Take the tizanidine for the neck pain before you start the Cymbalta.     Send me a message if the back pa prescriptions  · Written prescriptions must be picked up in office. · Please allow the office 48-72 hours to fill the prescription as our physicians rotate between multiple offices and procedure days in the hospitals.   · Patient must present photo ID at t

## 2020-08-10 NOTE — PROGRESS NOTES
HPI:    Patient ID: Andres Castillo is a 77year old female. 70-year-old female presents for follow-up. She is primarily concerned about left-sided neck and bilateral lower back pain radiating down the posterior thighs stopping behind the knees.   The ne NON FORMULARY as needed. CBD oil prn     • DULoxetine HCl 30 MG Oral Cap DR Particles Take 1 capsule (30 mg total) by mouth daily. 30 capsule 0   • tiZANidine HCl 2 MG Oral Tab Take 1 tablet (2 mg total) by mouth 2 (two) times daily as needed.  20 tablet 0 exercise program; also prescribed a 2 to 3-week course of tizanidine 2 mg twice daily PRN muscle tightness and spasms. If the symptoms persist the other option would be physical therapy.   We discussed repeating bilateral transforaminal epidural steroid in

## 2020-08-11 PROBLEM — N64.4 PAIN OF LEFT BREAST: Status: ACTIVE | Noted: 2020-08-11

## 2020-08-12 ENCOUNTER — LAB ENCOUNTER (OUTPATIENT)
Dept: LAB | Facility: REFERENCE LAB | Age: 70
End: 2020-08-12
Attending: INTERNAL MEDICINE
Payer: MEDICARE

## 2020-08-12 ENCOUNTER — HOSPITAL ENCOUNTER (OUTPATIENT)
Dept: GENERAL RADIOLOGY | Facility: HOSPITAL | Age: 70
Discharge: HOME | End: 2020-08-12
Attending: INTERNAL MEDICINE
Payer: MEDICARE

## 2020-08-12 DIAGNOSIS — I10 ESSENTIAL HYPERTENSION: ICD-10-CM

## 2020-08-12 DIAGNOSIS — E66.01 SEVERE OBESITY (BMI 35.0-35.9 WITH COMORBIDITY) (HCC): ICD-10-CM

## 2020-08-12 DIAGNOSIS — R05.3 CHRONIC COUGH: ICD-10-CM

## 2020-08-12 DIAGNOSIS — E78.00 HIGH CHOLESTEROL: ICD-10-CM

## 2020-08-12 LAB
ALBUMIN SERPL-MCNC: 3.5 G/DL (ref 3.4–5)
ALBUMIN/GLOB SERPL: 1 {RATIO} (ref 1–2)
ALP LIVER SERPL-CCNC: 66 U/L (ref 55–142)
ALT SERPL-CCNC: 57 U/L (ref 13–56)
ANION GAP SERPL CALC-SCNC: 7 MMOL/L (ref 0–18)
AST SERPL-CCNC: 28 U/L (ref 15–37)
BASOPHILS # BLD AUTO: 0.04 X10(3) UL (ref 0–0.2)
BASOPHILS NFR BLD AUTO: 0.9 %
BILIRUB SERPL-MCNC: 0.4 MG/DL (ref 0.1–2)
BUN BLD-MCNC: 29 MG/DL (ref 7–18)
BUN/CREAT SERPL: 37.2 (ref 10–20)
CALCIUM BLD-MCNC: 9.4 MG/DL (ref 8.5–10.1)
CHLORIDE SERPL-SCNC: 103 MMOL/L (ref 98–112)
CHOLEST SMN-MCNC: 211 MG/DL (ref ?–200)
CO2 SERPL-SCNC: 26 MMOL/L (ref 21–32)
CREAT BLD-MCNC: 0.78 MG/DL (ref 0.55–1.02)
DEPRECATED RDW RBC AUTO: 45.7 FL (ref 35.1–46.3)
EOSINOPHIL # BLD AUTO: 0.15 X10(3) UL (ref 0–0.7)
EOSINOPHIL NFR BLD AUTO: 3.4 %
ERYTHROCYTE [DISTWIDTH] IN BLOOD BY AUTOMATED COUNT: 13.1 % (ref 11–15)
ERYTHROCYTE [SEDIMENTATION RATE] IN BLOOD: 12 MM/HR (ref 0–30)
EST. AVERAGE GLUCOSE BLD GHB EST-MCNC: 108 MG/DL (ref 68–126)
GLOBULIN PLAS-MCNC: 3.6 G/DL (ref 2.8–4.4)
GLUCOSE BLD-MCNC: 114 MG/DL (ref 70–99)
HBA1C MFR BLD HPLC: 5.4 % (ref ?–5.7)
HCT VFR BLD AUTO: 35.4 % (ref 35–48)
HDLC SERPL-MCNC: 62 MG/DL (ref 40–59)
HGB BLD-MCNC: 11.7 G/DL (ref 12–16)
IMM GRANULOCYTES # BLD AUTO: 0.01 X10(3) UL (ref 0–1)
IMM GRANULOCYTES NFR BLD: 0.2 %
LDLC SERPL CALC-MCNC: 122 MG/DL (ref ?–100)
LYMPHOCYTES # BLD AUTO: 1.41 X10(3) UL (ref 1–4)
LYMPHOCYTES NFR BLD AUTO: 31.8 %
M PROTEIN MFR SERPL ELPH: 7.1 G/DL (ref 6.4–8.2)
MCH RBC QN AUTO: 31.5 PG (ref 26–34)
MCHC RBC AUTO-ENTMCNC: 33.1 G/DL (ref 31–37)
MCV RBC AUTO: 95.4 FL (ref 80–100)
MONOCYTES # BLD AUTO: 0.31 X10(3) UL (ref 0.1–1)
MONOCYTES NFR BLD AUTO: 7 %
NEUTROPHILS # BLD AUTO: 2.52 X10 (3) UL (ref 1.5–7.7)
NEUTROPHILS # BLD AUTO: 2.52 X10(3) UL (ref 1.5–7.7)
NEUTROPHILS NFR BLD AUTO: 56.7 %
NONHDLC SERPL-MCNC: 149 MG/DL (ref ?–130)
OSMOLALITY SERPL CALC.SUM OF ELEC: 289 MOSM/KG (ref 275–295)
PATIENT FASTING Y/N/NP: YES
PATIENT FASTING Y/N/NP: YES
PLATELET # BLD AUTO: 281 10(3)UL (ref 150–450)
POTASSIUM SERPL-SCNC: 5.1 MMOL/L (ref 3.5–5.1)
RBC # BLD AUTO: 3.71 X10(6)UL (ref 3.8–5.3)
SODIUM SERPL-SCNC: 136 MMOL/L (ref 136–145)
TRIGL SERPL-MCNC: 136 MG/DL (ref 30–149)
TSI SER-ACNC: 3.54 MIU/ML (ref 0.36–3.74)
VLDLC SERPL CALC-MCNC: 27 MG/DL (ref 0–30)
WBC # BLD AUTO: 4.4 X10(3) UL (ref 4–11)

## 2020-08-12 PROCEDURE — 84443 ASSAY THYROID STIM HORMONE: CPT

## 2020-08-12 PROCEDURE — 85652 RBC SED RATE AUTOMATED: CPT

## 2020-08-12 PROCEDURE — 80053 COMPREHEN METABOLIC PANEL: CPT

## 2020-08-12 PROCEDURE — 36415 COLL VENOUS BLD VENIPUNCTURE: CPT

## 2020-08-12 PROCEDURE — 83036 HEMOGLOBIN GLYCOSYLATED A1C: CPT

## 2020-08-12 PROCEDURE — 85025 COMPLETE CBC W/AUTO DIFF WBC: CPT

## 2020-08-12 PROCEDURE — 71046 X-RAY EXAM CHEST 2 VIEWS: CPT | Performed by: INTERNAL MEDICINE

## 2020-08-12 PROCEDURE — 80061 LIPID PANEL: CPT

## 2020-08-20 ENCOUNTER — HOSPITAL ENCOUNTER (OUTPATIENT)
Dept: MAMMOGRAPHY | Facility: HOSPITAL | Age: 70
Discharge: HOME | End: 2020-08-20
Attending: INTERNAL MEDICINE
Payer: MEDICARE

## 2020-08-20 ENCOUNTER — HOSPITAL ENCOUNTER (OUTPATIENT)
Dept: ULTRASOUND IMAGING | Facility: HOSPITAL | Age: 70
Discharge: HOME | End: 2020-08-20
Attending: INTERNAL MEDICINE
Payer: MEDICARE

## 2020-08-20 DIAGNOSIS — N64.4 PAIN OF LEFT BREAST: ICD-10-CM

## 2020-08-20 PROCEDURE — 77062 BREAST TOMOSYNTHESIS BI: CPT | Performed by: INTERNAL MEDICINE

## 2020-08-20 PROCEDURE — 77066 DX MAMMO INCL CAD BI: CPT | Performed by: INTERNAL MEDICINE

## 2020-08-20 PROCEDURE — 76642 ULTRASOUND BREAST LIMITED: CPT | Performed by: INTERNAL MEDICINE

## 2020-08-30 DIAGNOSIS — M17.0 BILATERAL PRIMARY OSTEOARTHRITIS OF KNEE: ICD-10-CM

## 2020-08-30 DIAGNOSIS — M48.061 SPINAL STENOSIS AT L4-L5 LEVEL: ICD-10-CM

## 2020-08-31 RX ORDER — DULOXETIN HYDROCHLORIDE 30 MG/1
CAPSULE, DELAYED RELEASE ORAL
Qty: 30 CAPSULE | Refills: 0 | Status: SHIPPED | OUTPATIENT
Start: 2020-08-31 | End: 2020-10-02

## 2020-08-31 NOTE — TELEPHONE ENCOUNTER
Refill request for duloxetine 30 mg, take 1 take 1 cap daily, #30, no refills    LOV: 8/10/20  NOV: None  Last refilled on 8/10/20    Patient states that duloxetine 30 mg has greatly helped with knee pain.  She states that this is the best she has felt in m

## 2020-09-02 PROBLEM — M15.3 OTHER SECONDARY OSTEOARTHRITIS OF MULTIPLE SITES: Status: ACTIVE | Noted: 2020-09-02

## 2020-09-02 PROBLEM — N60.02 CYST OF LEFT BREAST: Status: ACTIVE | Noted: 2020-09-02

## 2020-09-22 DIAGNOSIS — M16.11 PRIMARY OSTEOARTHRITIS OF RIGHT HIP: ICD-10-CM

## 2020-09-22 DIAGNOSIS — M17.0 BILATERAL PRIMARY OSTEOARTHRITIS OF KNEE: ICD-10-CM

## 2020-09-22 RX ORDER — CELECOXIB 100 MG/1
CAPSULE ORAL
Qty: 60 CAPSULE | Refills: 0 | Status: SHIPPED | OUTPATIENT
Start: 2020-09-22 | End: 2020-10-02

## 2020-09-22 NOTE — TELEPHONE ENCOUNTER
Medication request: celebrex 100 mg oral cap. Take 1 capsule by mouth BID. #60.  No refills    LOV: 8/10/2020  NOV: none    [place]: 7/30/2020

## 2020-09-29 ENCOUNTER — PATIENT MESSAGE (OUTPATIENT)
Dept: NEUROLOGY | Facility: CLINIC | Age: 70
End: 2020-09-29

## 2020-09-30 NOTE — TELEPHONE ENCOUNTER
From: Priscila Snell  To: Nolene Saint, DO  Sent: 9/29/2020 2:12 PM CDT  Subject: Other    I am trying to get a \"tier exception\" for the drug Celecoxib from [facility].  They have faxed a form over today that needs to be completed and faxed back to [employer]

## 2020-10-02 DIAGNOSIS — M16.11 PRIMARY OSTEOARTHRITIS OF RIGHT HIP: ICD-10-CM

## 2020-10-02 DIAGNOSIS — M48.061 SPINAL STENOSIS AT L4-L5 LEVEL: ICD-10-CM

## 2020-10-02 DIAGNOSIS — M17.0 BILATERAL PRIMARY OSTEOARTHRITIS OF KNEE: ICD-10-CM

## 2020-10-02 RX ORDER — CELECOXIB 100 MG/1
CAPSULE ORAL
Qty: 60 CAPSULE | Refills: 0 | Status: SHIPPED | OUTPATIENT
Start: 2020-10-02 | End: 2020-11-13

## 2020-10-02 RX ORDER — DULOXETIN HYDROCHLORIDE 30 MG/1
30 CAPSULE, DELAYED RELEASE ORAL DAILY
Qty: 30 CAPSULE | Refills: 2 | Status: SHIPPED | OUTPATIENT
Start: 2020-10-02 | End: 2020-12-16

## 2020-10-02 NOTE — TELEPHONE ENCOUNTER
Medication request: Duloxetine 30 mg oral cap. Take one capsule by mouth one time daily. #30. No refills    LOV: 8/10/2020  NOV: None    [place]: 8/31/2020      Medication request: Celebrex 100 mg Oral cap. Take 1 capsule by mouth BID. #60. No refills.      LOV:8/10/2020  NOV: None    [place]: 9/22/2020

## 2020-10-05 NOTE — LETTER
Sugey [address] 38   Date:   2/4/2020     Name:   Jaylon Pickadr    YOB: 1950   MRN:   [id number]       WHERE IS YOUR PAIN NOW? [place] the areas on your body where you feel the described sensations.   Use the appropriate sym Detail Level: Detailed Add 52 Modifier (Optional): no Medical Necessity Information: It is in your best interest to select a reason for this procedure from the list below. All of these items fulfill various CMS LCD requirements except the new and changing color options. Consent: The patient's consent was obtained including but not limited to risks of crusting, scabbing, blistering, scarring, darker or lighter pigmentary change, recurrence, incomplete removal and infection. Medical Necessity Clause: This procedure was medically necessary because the lesions that were treated were: Post-Care Instructions: I reviewed with the patient in detail post-care instructions. Patient is to wear sunprotection, and avoid picking at any of the treated lesions. Pt may apply Vaseline to crusted or scabbing areas. Duration Of Freeze Thaw-Cycle (Seconds): 0

## 2020-10-08 ENCOUNTER — MED REC SCAN ONLY (OUTPATIENT)
Dept: NEUROLOGY | Facility: CLINIC | Age: 70
End: 2020-10-08

## 2020-10-12 ENCOUNTER — PATIENT MESSAGE (OUTPATIENT)
Dept: NEUROLOGY | Facility: CLINIC | Age: 70
End: 2020-10-12

## 2020-10-12 NOTE — TELEPHONE ENCOUNTER
From: Sheila Brown  To: Joseph Zuniga DO  Sent: 10/12/2020 9:45 AM CDT  Subject: Tahmina Grayson,    Has Dr. Arlene De La Rosa completed the \"tier change\" form from [facility] and faxed it back?  [facility] had indicated they would contact me within 72 hours of the

## 2020-10-14 ENCOUNTER — MED REC SCAN ONLY (OUTPATIENT)
Dept: NEUROLOGY | Facility: CLINIC | Age: 70
End: 2020-10-14

## 2020-10-14 NOTE — TELEPHONE ENCOUNTER
Received approval for Celecoxib 100 mg capsule on the formulary medication with the quantity Limit of 120 capsules per 30 days.    #[id number]  Ticket# [id number]  Approval timeframe: start date 10/08/2020  End date: 12/31/2020    Approval letter sent to s

## 2020-11-10 ENCOUNTER — HOSPITAL ENCOUNTER (OUTPATIENT)
Dept: ULTRASOUND IMAGING | Facility: HOSPITAL | Age: 70
Discharge: HOME | End: 2020-11-10
Attending: INTERNAL MEDICINE
Payer: MEDICARE

## 2020-11-10 DIAGNOSIS — N60.02 CYST OF LEFT BREAST: ICD-10-CM

## 2020-11-10 PROCEDURE — 76642 ULTRASOUND BREAST LIMITED: CPT | Performed by: INTERNAL MEDICINE

## 2020-11-13 DIAGNOSIS — M16.11 PRIMARY OSTEOARTHRITIS OF RIGHT HIP: ICD-10-CM

## 2020-11-13 DIAGNOSIS — M17.0 BILATERAL PRIMARY OSTEOARTHRITIS OF KNEE: ICD-10-CM

## 2020-11-16 ENCOUNTER — PATIENT MESSAGE (OUTPATIENT)
Dept: NEUROLOGY | Facility: CLINIC | Age: 70
End: 2020-11-16

## 2020-11-16 RX ORDER — CELECOXIB 100 MG/1
100 CAPSULE ORAL 2 TIMES DAILY
Qty: 60 CAPSULE | Refills: 0 | Status: SHIPPED | OUTPATIENT
Start: 2020-11-16 | End: 2020-12-05

## 2020-11-16 NOTE — TELEPHONE ENCOUNTER
Medication request: celecoxib 100 mg Oral cap. Take 1 capsule by mouth twice daily. #60. No refills.      LOV: 8/10/2020  NOV: None    [place]: 10/2/2020

## 2020-11-17 ENCOUNTER — TELEPHONE (OUTPATIENT)
Dept: NEUROLOGY | Facility: CLINIC | Age: 70
End: 2020-11-17

## 2020-11-17 NOTE — TELEPHONE ENCOUNTER
From: Jose Burns  To: Avery Spencer DO  Sent: 11/16/2020 4:17 PM CST  Subject: Prescription Question    Paul Handley, I still have not gotten the issue resolved on the \"tier change\" on the Celecoxib in order to get the lower co pay.  They received the form

## 2020-11-23 PROBLEM — E78.00 HIGH CHOLESTEROL: Status: ACTIVE | Noted: 2020-11-23

## 2020-11-23 PROBLEM — R79.89 ABNORMAL LIVER FUNCTION TESTS: Status: ACTIVE | Noted: 2020-11-23

## 2020-11-24 ENCOUNTER — LAB ENCOUNTER (OUTPATIENT)
Dept: LAB | Facility: HOSPITAL | Age: 70
End: 2020-11-24
Attending: INTERNAL MEDICINE
Payer: MEDICARE

## 2020-11-24 DIAGNOSIS — R79.89 ABNORMAL LIVER FUNCTION TESTS: ICD-10-CM

## 2020-11-24 DIAGNOSIS — E78.00 HIGH CHOLESTEROL: ICD-10-CM

## 2020-11-24 PROCEDURE — 80061 LIPID PANEL: CPT

## 2020-11-24 PROCEDURE — 36415 COLL VENOUS BLD VENIPUNCTURE: CPT

## 2020-11-24 PROCEDURE — 80053 COMPREHEN METABOLIC PANEL: CPT

## 2020-12-05 DIAGNOSIS — M16.11 PRIMARY OSTEOARTHRITIS OF RIGHT HIP: ICD-10-CM

## 2020-12-05 DIAGNOSIS — M17.0 BILATERAL PRIMARY OSTEOARTHRITIS OF KNEE: ICD-10-CM

## 2020-12-07 RX ORDER — CELECOXIB 100 MG/1
100 CAPSULE ORAL 2 TIMES DAILY
Qty: 180 CAPSULE | Refills: 0 | Status: SHIPPED | OUTPATIENT
Start: 2020-12-15 | End: 2021-03-31

## 2020-12-07 NOTE — TELEPHONE ENCOUNTER
Medication request: Celecoxib 100 mg Oral Cap. TSke 1 capsule by mouth 2 times daily. #60. No refills.      LOV: 8/10/2020  NOV: None    [place]: 11/16/2020

## 2020-12-16 DIAGNOSIS — M48.061 SPINAL STENOSIS AT L4-L5 LEVEL: ICD-10-CM

## 2020-12-16 DIAGNOSIS — M17.0 BILATERAL PRIMARY OSTEOARTHRITIS OF KNEE: ICD-10-CM

## 2020-12-16 RX ORDER — DULOXETIN HYDROCHLORIDE 30 MG/1
30 CAPSULE, DELAYED RELEASE ORAL DAILY
Qty: 30 CAPSULE | Refills: 2 | Status: SHIPPED | OUTPATIENT
Start: 2020-12-16 | End: 2021-01-21

## 2020-12-16 NOTE — TELEPHONE ENCOUNTER
Left message for patient to call the office with an update how she is doing with her pain and how the medication is working for her. It's been 4 month since Dr. Bowen Tracey saw patient so asked patient to schedule a follow up appointment with Dr. Bowen Tracey.

## 2020-12-18 ENCOUNTER — PATIENT MESSAGE (OUTPATIENT)
Dept: NEUROLOGY | Facility: CLINIC | Age: 70
End: 2020-12-18

## 2020-12-21 NOTE — TELEPHONE ENCOUNTER
From: Viv Willingham  To: Soniya Minium, DO  Sent: 12/18/2020 3:19 PM CST  Subject: Ross Hi, I got your message on Wednesday and left a message on Thursday to have you call me. Called too late today and office was already closed.  I am doing fine -

## 2021-01-21 ENCOUNTER — OFFICE VISIT (OUTPATIENT)
Dept: NEUROLOGY | Facility: CLINIC | Age: 71
End: 2021-01-21
Payer: MEDICARE

## 2021-01-21 VITALS
HEART RATE: 79 BPM | HEIGHT: 60 IN | DIASTOLIC BLOOD PRESSURE: 62 MMHG | OXYGEN SATURATION: 97 % | BODY MASS INDEX: 37.11 KG/M2 | WEIGHT: 189 LBS | SYSTOLIC BLOOD PRESSURE: 114 MMHG

## 2021-01-21 DIAGNOSIS — M17.0 BILATERAL PRIMARY OSTEOARTHRITIS OF KNEE: ICD-10-CM

## 2021-01-21 DIAGNOSIS — M48.061 SPINAL STENOSIS AT L4-L5 LEVEL: ICD-10-CM

## 2021-01-21 PROCEDURE — 99214 OFFICE O/P EST MOD 30 MIN: CPT | Performed by: PHYSICAL MEDICINE & REHABILITATION

## 2021-01-21 RX ORDER — DULOXETIN HYDROCHLORIDE 30 MG/1
30 CAPSULE, DELAYED RELEASE ORAL DAILY
Qty: 90 CAPSULE | Refills: 0 | Status: SHIPPED | OUTPATIENT
Start: 2021-01-21 | End: 2021-04-08

## 2021-01-21 NOTE — PROGRESS NOTES
Progress note    C/C: Bilateral knee pain    HPI: 72-year-old female presents for follow-up. Taking duloxetine 30 mg nightly, and Celebrex 100 mg twice daily.   No side effects from either medication, except for possibly oversleeping with the duloxetine, t

## 2021-01-28 ENCOUNTER — PATIENT MESSAGE (OUTPATIENT)
Dept: NEUROLOGY | Facility: CLINIC | Age: 71
End: 2021-01-28

## 2021-01-28 NOTE — TELEPHONE ENCOUNTER
From: Priscila Snell  To: Nolene Saint, DO  Sent: 1/28/2021 9:53 AM CST  Subject: Prescription Question    Dr. Veronica Lock, It's been a week since I decreased to 1 celecoxib per day from previous 2.  Some of pain has resurfaced - leg cramps at night, increase

## 2021-03-04 DIAGNOSIS — Z23 NEED FOR VACCINATION: ICD-10-CM

## 2021-03-31 DIAGNOSIS — M17.0 BILATERAL PRIMARY OSTEOARTHRITIS OF KNEE: ICD-10-CM

## 2021-03-31 DIAGNOSIS — M16.11 PRIMARY OSTEOARTHRITIS OF RIGHT HIP: ICD-10-CM

## 2021-03-31 RX ORDER — CELECOXIB 100 MG/1
CAPSULE ORAL
Qty: 180 CAPSULE | Refills: 0 | Status: SHIPPED | OUTPATIENT
Start: 2021-03-31 | End: 2021-06-28

## 2021-03-31 NOTE — TELEPHONE ENCOUNTER
Medication request: Celecoxib 100 mg oral capsule. Take 1 capsule by mouth 2 times daily. #180. No refills.      MUO:5/12/07  NOV: none    [place]12/7/20

## 2021-04-07 DIAGNOSIS — M48.061 SPINAL STENOSIS AT L4-L5 LEVEL: ICD-10-CM

## 2021-04-07 DIAGNOSIS — M17.0 BILATERAL PRIMARY OSTEOARTHRITIS OF KNEE: ICD-10-CM

## 2021-04-07 NOTE — TELEPHONE ENCOUNTER
Medication request: Duloxetine 30 mg oral cap. Take 1 capsule by mouth daily. #90. No refills.      JSW:[birth year]  NOV: None    [place]: 1/21/21

## 2021-04-08 RX ORDER — DULOXETIN HYDROCHLORIDE 30 MG/1
30 CAPSULE, DELAYED RELEASE ORAL DAILY
Qty: 90 CAPSULE | Refills: 2 | Status: SHIPPED | OUTPATIENT
Start: 2021-04-08 | End: 2021-07-27

## 2021-06-13 ENCOUNTER — PATIENT MESSAGE (OUTPATIENT)
Dept: NEUROLOGY | Facility: CLINIC | Age: 71
End: 2021-06-13

## 2021-06-13 DIAGNOSIS — M17.0 BILATERAL PRIMARY OSTEOARTHRITIS OF KNEE: Primary | ICD-10-CM

## 2021-06-14 ENCOUNTER — TELEPHONE (OUTPATIENT)
Dept: NEUROLOGY | Facility: CLINIC | Age: 71
End: 2021-06-14

## 2021-06-14 NOTE — TELEPHONE ENCOUNTER
Per Medicare Guidelines -no authorization is required for Synvisc One injection   Viscosupplementation with Hyaluronans will only be covered for Osteoarthritis of the knee or shoulder joint when radiological evidence to support the diagnosis of osteoarthri

## 2021-06-14 NOTE — TELEPHONE ENCOUNTER
From: Derick Piña  To:  Sonja Crum DO  Sent: 6/13/2021 10:53 AM CDT  Subject: Non-Urgent Medical Question    Dr. Narcisa Camp,  I am managing well enough with knees with current medications, with the exception of when I need to  one spot for a pro

## 2021-06-28 DIAGNOSIS — M17.0 BILATERAL PRIMARY OSTEOARTHRITIS OF KNEE: ICD-10-CM

## 2021-06-28 DIAGNOSIS — M16.11 PRIMARY OSTEOARTHRITIS OF RIGHT HIP: ICD-10-CM

## 2021-06-28 RX ORDER — CELECOXIB 100 MG/1
CAPSULE ORAL
Qty: 180 CAPSULE | Refills: 0 | Status: SHIPPED | OUTPATIENT
Start: 2021-06-28 | End: 2021-09-17

## 2021-06-28 NOTE — TELEPHONE ENCOUNTER
Patient of Domingo Judge.     Medication request: CELECOXIB 100 MG  TAKE ONE CAPSULE BY MOUTH TWICE DAILY     LOV: 1/21/21  NOV: 6/29/21     [place] 3/31/21 #180

## 2021-06-29 ENCOUNTER — OFFICE VISIT (OUTPATIENT)
Dept: NEUROLOGY | Facility: CLINIC | Age: 71
End: 2021-06-29
Payer: MEDICARE

## 2021-06-29 DIAGNOSIS — M17.0 PRIMARY OSTEOARTHRITIS OF BOTH KNEES: ICD-10-CM

## 2021-06-29 PROCEDURE — 20611 DRAIN/INJ JOINT/BURSA W/US: CPT | Performed by: PHYSICAL MEDICINE & REHABILITATION

## 2021-06-29 NOTE — PROCEDURES
Dx: primary osteoarthritis of both knees  Procedure: bilateral knee joint SYNVISC 1 injection under US guidance    The patient is here for a bilateral knee injection done under ultrasound guidance.   Under ultrasound guidance the bilateral suprapatellar bur

## 2021-06-30 ENCOUNTER — PATIENT MESSAGE (OUTPATIENT)
Dept: NEUROLOGY | Facility: CLINIC | Age: 71
End: 2021-06-30

## 2021-06-30 DIAGNOSIS — M17.0 PRIMARY OSTEOARTHRITIS OF BOTH KNEES: Primary | ICD-10-CM

## 2021-06-30 NOTE — TELEPHONE ENCOUNTER
Please make sure she's not having any fever, chills or redness in the knee, if so ER/UC. if not keep up with the icing and NSAIDs.

## 2021-06-30 NOTE — TELEPHONE ENCOUNTER
Patient denies any signs/symptoms of infection. She will continue to ice and use NSAIDs. She will monitor the swelling and call back if it does not improve or go to ED/UC for s/s of infection.

## 2021-06-30 NOTE — TELEPHONE ENCOUNTER
From: Shelby Ocampo  To: Meredith Cowart DO  Sent: 6/30/2021 8:09 AM CDT  Subject: Visit Follow-up Question    My left knee started hurting and swelling yesterday afternoon. I have been icing and taking Ibruprophen. Still very sore and difficult to walk.  I

## 2021-07-01 ENCOUNTER — OFFICE VISIT (OUTPATIENT)
Dept: NEUROLOGY | Facility: CLINIC | Age: 71
End: 2021-07-01
Payer: MEDICARE

## 2021-07-01 ENCOUNTER — TELEPHONE (OUTPATIENT)
Dept: NEUROLOGY | Facility: CLINIC | Age: 71
End: 2021-07-01

## 2021-07-01 DIAGNOSIS — M25.562 ACUTE PAIN OF LEFT KNEE: Primary | ICD-10-CM

## 2021-07-01 PROCEDURE — 20611 DRAIN/INJ JOINT/BURSA W/US: CPT | Performed by: PHYSICAL MEDICINE & REHABILITATION

## 2021-07-01 PROCEDURE — 99214 OFFICE O/P EST MOD 30 MIN: CPT | Performed by: PHYSICAL MEDICINE & REHABILITATION

## 2021-07-01 NOTE — TELEPHONE ENCOUNTER
Per Medicare guidelines-no authorization required for Lidocaine/Kenalog injections in office    Left knee aspiration and injection with CSI under ultrasound guidance CPT 93170+    Status: Approved-Covered Benefit    Routing to clinical staff to [facility]

## 2021-07-01 NOTE — TELEPHONE ENCOUNTER
S/w Dr. Rahul Cordoba who states patient will need to come in to have a steroid injection since synvisc can crystallize in the knee and cause swelling. Patient does not want to wait it out since she is going out of town tomorrow.

## 2021-07-06 PROBLEM — M25.562 ACUTE PAIN OF LEFT KNEE: Status: ACTIVE | Noted: 2021-07-06

## 2021-07-06 NOTE — PROCEDURES
The patient is here for a left knee injection done under ultrasound guidance. Under ultrasound guidance the left suprapatellar bursa was identified and a esperanza was placed on the patient's skin. The skin was cleaned with alcohol swabs x 3 and anesthetized wi

## 2021-07-06 NOTE — PROGRESS NOTES
Low Back Pain H & P    Chief Complaint: Patient presents with: Injection: LOV: 06/29/2021. Patient here for left knee injeciton. Patient  rates pain 10/10 when walking. Nursing note reviewed and verified.     Patient was last seen on 6/29/2021 for bila defibrillator: No        Breast feeding: Not Asked        Reaction to local anesthetic: No         Service: Not Asked        Blood Transfusions: Not Asked        Caffeine Concern: Yes          coffee 1-2 cups daily        Occupational Exposure: Not icterus. Pupils are equal, round, and reactive to light. No redness or discharge bilaterally. Skin:  There are no rashes or lesions. Vitals: There were no vitals filed for this visit. She is sitting in a wheelchair.     Knee  Inspection: no ecchym

## 2021-08-17 ENCOUNTER — OFFICE VISIT (OUTPATIENT)
Dept: NEUROLOGY | Facility: CLINIC | Age: 71
End: 2021-08-17
Payer: MEDICARE

## 2021-08-17 ENCOUNTER — PATIENT MESSAGE (OUTPATIENT)
Dept: NEUROLOGY | Facility: CLINIC | Age: 71
End: 2021-08-17

## 2021-08-17 VITALS — HEIGHT: 60 IN | BODY MASS INDEX: 37.5 KG/M2 | WEIGHT: 191 LBS

## 2021-08-17 DIAGNOSIS — M17.0 PRIMARY OSTEOARTHRITIS OF BOTH KNEES: Primary | ICD-10-CM

## 2021-08-17 PROCEDURE — 99214 OFFICE O/P EST MOD 30 MIN: CPT | Performed by: PHYSICAL MEDICINE & REHABILITATION

## 2021-08-17 RX ORDER — DULOXETINE 40 MG/1
40 CAPSULE, DELAYED RELEASE ORAL NIGHTLY
Qty: 30 CAPSULE | Refills: 0 | Status: SHIPPED | OUTPATIENT
Start: 2021-08-17 | End: 2021-08-17

## 2021-08-17 NOTE — TELEPHONE ENCOUNTER
From: Jaiver Morse  To: Shannan Parker DO  Sent: 8/17/2021 1:52 PM CDT  Subject: Other    Dr. Zakia Murphy,    I was mistaken - I was taking only one 60 mg duloxetine HCI at night And 2 Celebrex a day.     I am going to decrease to a 30 mg a day in morning and

## 2021-08-17 NOTE — PROGRESS NOTES
Progress note    C/C: bilateral knee pain, left worse than right    HPI: 72year old female presents for f/u.  Increased left knee pain following SYNVISC 1 injection; while I was on vacation patient was seen by my partner Dr. Rahul Cordoba who did a left knee joint [address] [place] [address]

## 2021-09-16 DIAGNOSIS — M17.0 BILATERAL PRIMARY OSTEOARTHRITIS OF KNEE: ICD-10-CM

## 2021-09-16 DIAGNOSIS — M16.11 PRIMARY OSTEOARTHRITIS OF RIGHT HIP: ICD-10-CM

## 2021-09-16 NOTE — TELEPHONE ENCOUNTER
Medication request: Celebrex 100 mg oral cap. Take one capsule by mouth twice daily. #180.  No refills    LOV:8/17/21  NOV:none    [place]:6/28/21

## 2021-09-17 RX ORDER — CELECOXIB 100 MG/1
CAPSULE ORAL
Qty: 180 CAPSULE | Refills: 0 | Status: SHIPPED | OUTPATIENT
Start: 2021-09-17

## 2021-12-06 ENCOUNTER — LAB ENCOUNTER (OUTPATIENT)
Dept: LAB | Facility: HOSPITAL | Age: 71
End: 2021-12-06
Attending: INTERNAL MEDICINE
Payer: MEDICARE

## 2021-12-06 DIAGNOSIS — D64.9 ANEMIA, UNSPECIFIED TYPE: ICD-10-CM

## 2021-12-06 DIAGNOSIS — E87.1 HYPONATREMIA: ICD-10-CM

## 2021-12-06 PROCEDURE — 84466 ASSAY OF TRANSFERRIN: CPT

## 2021-12-06 PROCEDURE — 83540 ASSAY OF IRON: CPT

## 2021-12-06 PROCEDURE — 80048 BASIC METABOLIC PNL TOTAL CA: CPT

## 2021-12-06 PROCEDURE — 36415 COLL VENOUS BLD VENIPUNCTURE: CPT

## 2021-12-06 PROCEDURE — 85025 COMPLETE CBC W/AUTO DIFF WBC: CPT

## 2021-12-06 PROCEDURE — 82746 ASSAY OF FOLIC ACID SERUM: CPT

## 2021-12-06 PROCEDURE — 82607 VITAMIN B-12: CPT

## 2022-01-10 ENCOUNTER — HOSPITAL ENCOUNTER (OUTPATIENT)
Dept: MAMMOGRAPHY | Facility: HOSPITAL | Age: 72
Discharge: HOME | End: 2022-01-10
Attending: INTERNAL MEDICINE
Payer: MEDICARE

## 2022-01-10 DIAGNOSIS — Z12.31 ENCOUNTER FOR SCREENING MAMMOGRAM FOR BREAST CANCER: ICD-10-CM

## 2022-01-10 PROCEDURE — 77063 BREAST TOMOSYNTHESIS BI: CPT | Performed by: INTERNAL MEDICINE

## 2022-01-10 PROCEDURE — 77067 SCR MAMMO BI INCL CAD: CPT | Performed by: INTERNAL MEDICINE

## 2022-02-08 ENCOUNTER — TELEPHONE (OUTPATIENT)
Dept: PHYSICAL THERAPY | Age: 72
End: 2022-02-08

## 2022-02-08 NOTE — TELEPHONE ENCOUNTER
Spoke with pt regarding her post-op rehab care. I will place holds on my schedule starting the week of 3/7. I provided pt with our fax # to send over the PT script for post-op care. She will contact me when she is ready to schedule her OP appts.

## 2022-02-14 ENCOUNTER — PATIENT MESSAGE (OUTPATIENT)
Dept: PHYSICAL THERAPY | Age: 72
End: 2022-02-14

## 2022-02-14 ENCOUNTER — ORDER TRANSCRIPTION (OUTPATIENT)
Dept: PHYSICAL THERAPY | Facility: HOSPITAL | Age: 72
End: 2022-02-14

## 2022-02-14 ENCOUNTER — TELEPHONE (OUTPATIENT)
Dept: PHYSICAL THERAPY | Facility: HOSPITAL | Age: 72
End: 2022-02-14

## 2022-03-03 ENCOUNTER — OFFICE VISIT (OUTPATIENT)
Dept: PHYSICAL THERAPY | Age: 72
End: 2022-03-03
Attending: ORTHOPAEDIC SURGERY
Payer: MEDICARE

## 2022-03-03 PROCEDURE — 97110 THERAPEUTIC EXERCISES: CPT

## 2022-03-03 PROCEDURE — 97530 THERAPEUTIC ACTIVITIES: CPT

## 2022-03-03 PROCEDURE — 97161 PT EVAL LOW COMPLEX 20 MIN: CPT

## 2022-03-07 ENCOUNTER — OFFICE VISIT (OUTPATIENT)
Dept: PHYSICAL THERAPY | Age: 72
End: 2022-03-07
Attending: ORTHOPAEDIC SURGERY
Payer: MEDICARE

## 2022-03-07 DIAGNOSIS — Z96.652 S/P TOTAL KNEE REPLACEMENT, LEFT: ICD-10-CM

## 2022-03-07 DIAGNOSIS — M17.11 RIGHT KNEE DJD: ICD-10-CM

## 2022-03-07 PROCEDURE — 97110 THERAPEUTIC EXERCISES: CPT

## 2022-03-07 PROCEDURE — 97140 MANUAL THERAPY 1/> REGIONS: CPT

## 2022-03-07 NOTE — PATIENT INSTRUCTIONS
Access Code: [email or web address]  URL: [email or web address]  Date: 03/07/2022  Prepared by: Lopez Peres    Exercises  Standing Hip Abduction with Counter Support - 3 x daily - 7 x weekly - 1-2 sets - 10 reps  Standing Hip Extension with Counter Support - 3 x daily - 7 x weekly - 1-2 sets - 10 reps  Heel rises with counter support - 3 x daily - 7 x weekly - 1-2 sets - 10 reps

## 2022-03-08 ENCOUNTER — TELEPHONE (OUTPATIENT)
Dept: PHYSICAL THERAPY | Age: 72
End: 2022-03-08

## 2022-03-09 ENCOUNTER — OFFICE VISIT (OUTPATIENT)
Dept: PHYSICAL THERAPY | Age: 72
End: 2022-03-09
Attending: ORTHOPAEDIC SURGERY
Payer: MEDICARE

## 2022-03-09 DIAGNOSIS — M17.11 RIGHT KNEE DJD: ICD-10-CM

## 2022-03-09 DIAGNOSIS — Z96.652 S/P TOTAL KNEE REPLACEMENT, LEFT: ICD-10-CM

## 2022-03-09 PROCEDURE — 97110 THERAPEUTIC EXERCISES: CPT

## 2022-03-09 PROCEDURE — 97140 MANUAL THERAPY 1/> REGIONS: CPT

## 2022-03-09 NOTE — TELEPHONE ENCOUNTER
[facility] evaluation is not going through MD. Jose Varela called the physician office and obtained an email to send the evaluation to at [facility]. [email or web address]. I emailed a copy of pt's PT evaluation to Dr. Nithin Damico today.

## 2022-03-11 ENCOUNTER — OFFICE VISIT (OUTPATIENT)
Dept: PHYSICAL THERAPY | Age: 72
End: 2022-03-11
Attending: INTERNAL MEDICINE
Payer: MEDICARE

## 2022-03-11 PROCEDURE — 97140 MANUAL THERAPY 1/> REGIONS: CPT

## 2022-03-11 PROCEDURE — 97110 THERAPEUTIC EXERCISES: CPT

## 2022-03-14 ENCOUNTER — OFFICE VISIT (OUTPATIENT)
Dept: PHYSICAL THERAPY | Age: 72
End: 2022-03-14
Attending: ORTHOPAEDIC SURGERY
Payer: MEDICARE

## 2022-03-14 DIAGNOSIS — Z96.652 S/P TOTAL KNEE REPLACEMENT, LEFT: ICD-10-CM

## 2022-03-14 DIAGNOSIS — M17.11 RIGHT KNEE DJD: ICD-10-CM

## 2022-03-14 PROCEDURE — 97110 THERAPEUTIC EXERCISES: CPT

## 2022-03-14 PROCEDURE — 97140 MANUAL THERAPY 1/> REGIONS: CPT

## 2022-03-16 ENCOUNTER — OFFICE VISIT (OUTPATIENT)
Dept: PHYSICAL THERAPY | Age: 72
End: 2022-03-16
Attending: ORTHOPAEDIC SURGERY
Payer: MEDICARE

## 2022-03-16 DIAGNOSIS — M17.11 RIGHT KNEE DJD: ICD-10-CM

## 2022-03-16 DIAGNOSIS — Z96.652 S/P TOTAL KNEE REPLACEMENT, LEFT: ICD-10-CM

## 2022-03-16 PROCEDURE — 97112 NEUROMUSCULAR REEDUCATION: CPT

## 2022-03-16 PROCEDURE — 97140 MANUAL THERAPY 1/> REGIONS: CPT

## 2022-03-16 PROCEDURE — 97110 THERAPEUTIC EXERCISES: CPT

## 2022-03-18 ENCOUNTER — OFFICE VISIT (OUTPATIENT)
Dept: PHYSICAL THERAPY | Age: 72
End: 2022-03-18
Attending: INTERNAL MEDICINE
Payer: MEDICARE

## 2022-03-18 PROCEDURE — 97140 MANUAL THERAPY 1/> REGIONS: CPT

## 2022-03-18 PROCEDURE — 97110 THERAPEUTIC EXERCISES: CPT

## 2022-03-21 ENCOUNTER — APPOINTMENT (OUTPATIENT)
Dept: PHYSICAL THERAPY | Age: 72
End: 2022-03-21
Attending: ORTHOPAEDIC SURGERY
Payer: MEDICARE

## 2022-03-21 ENCOUNTER — TELEPHONE (OUTPATIENT)
Dept: PHYSICAL THERAPY | Facility: HOSPITAL | Age: 72
End: 2022-03-21

## 2022-03-23 ENCOUNTER — APPOINTMENT (OUTPATIENT)
Dept: PHYSICAL THERAPY | Age: 72
End: 2022-03-23
Attending: ORTHOPAEDIC SURGERY
Payer: MEDICARE

## 2022-03-23 ENCOUNTER — HOSPITAL ENCOUNTER (OUTPATIENT)
Dept: ULTRASOUND IMAGING | Facility: HOSPITAL | Age: 72
Discharge: HOME | End: 2022-03-23
Attending: NURSE PRACTITIONER
Payer: MEDICARE

## 2022-03-23 ENCOUNTER — LAB ENCOUNTER (OUTPATIENT)
Dept: LAB | Facility: HOSPITAL | Age: 72
End: 2022-03-23
Attending: NURSE PRACTITIONER
Payer: MEDICARE

## 2022-03-23 ENCOUNTER — HOSPITAL ENCOUNTER (OUTPATIENT)
Dept: GENERAL RADIOLOGY | Facility: HOSPITAL | Age: 72
Discharge: HOME | End: 2022-03-23
Attending: NURSE PRACTITIONER
Payer: MEDICARE

## 2022-03-23 DIAGNOSIS — R60.0 LEG EDEMA, RIGHT: ICD-10-CM

## 2022-03-23 DIAGNOSIS — Z96.651 STATUS POST TOTAL RIGHT KNEE REPLACEMENT: ICD-10-CM

## 2022-03-23 DIAGNOSIS — R39.9 UTI SYMPTOMS: ICD-10-CM

## 2022-03-23 DIAGNOSIS — E87.1 HYPONATREMIA: ICD-10-CM

## 2022-03-23 DIAGNOSIS — R50.9 FEVER, UNSPECIFIED FEVER CAUSE: ICD-10-CM

## 2022-03-23 DIAGNOSIS — D64.9 ANEMIA, UNSPECIFIED TYPE: ICD-10-CM

## 2022-03-23 DIAGNOSIS — M79.671 RIGHT FOOT PAIN: ICD-10-CM

## 2022-03-23 LAB
ANION GAP SERPL CALC-SCNC: 8 MMOL/L (ref 0–18)
BASOPHILS # BLD AUTO: 0.01 X10(3) UL (ref 0–0.2)
BASOPHILS NFR BLD AUTO: 0.2 %
BILIRUB UR QL: NEGATIVE
BUN BLD-MCNC: 24 MG/DL (ref 7–18)
BUN/CREAT SERPL: 28.6 (ref 10–20)
CALCIUM BLD-MCNC: 9.2 MG/DL (ref 8.5–10.1)
CHLORIDE SERPL-SCNC: 105 MMOL/L (ref 98–112)
CLARITY UR: CLEAR
CO2 SERPL-SCNC: 25 MMOL/L (ref 21–32)
COLOR UR: YELLOW
CREAT BLD-MCNC: 0.84 MG/DL
DEPRECATED RDW RBC AUTO: 46.3 FL (ref 35.1–46.3)
EOSINOPHIL # BLD AUTO: 0.18 X10(3) UL (ref 0–0.7)
EOSINOPHIL NFR BLD AUTO: 3 %
ERYTHROCYTE [DISTWIDTH] IN BLOOD BY AUTOMATED COUNT: 12.9 % (ref 11–15)
FASTING STATUS PATIENT QL REPORTED: NO
GLUCOSE BLD-MCNC: 96 MG/DL (ref 70–99)
GLUCOSE UR-MCNC: NEGATIVE MG/DL
HCT VFR BLD AUTO: 27.2 %
HGB BLD-MCNC: 8.5 G/DL
HGB UR QL STRIP.AUTO: NEGATIVE
IMM GRANULOCYTES # BLD AUTO: 0.02 X10(3) UL (ref 0–1)
IMM GRANULOCYTES NFR BLD: 0.3 %
KETONES UR-MCNC: NEGATIVE MG/DL
LEUKOCYTE ESTERASE UR QL STRIP.AUTO: NEGATIVE
LYMPHOCYTES # BLD AUTO: 1.12 X10(3) UL (ref 1–4)
LYMPHOCYTES NFR BLD AUTO: 18.5 %
MCH RBC QN AUTO: 30.7 PG (ref 26–34)
MCHC RBC AUTO-ENTMCNC: 31.3 G/DL (ref 31–37)
MCV RBC AUTO: 98.2 FL
MONOCYTES # BLD AUTO: 0.38 X10(3) UL (ref 0.1–1)
MONOCYTES NFR BLD AUTO: 6.3 %
NEUTROPHILS # BLD AUTO: 4.35 X10 (3) UL (ref 1.5–7.7)
NEUTROPHILS # BLD AUTO: 4.35 X10(3) UL (ref 1.5–7.7)
NEUTROPHILS NFR BLD AUTO: 71.7 %
NITRITE UR QL STRIP.AUTO: NEGATIVE
OSMOLALITY SERPL CALC.SUM OF ELEC: 290 MOSM/KG (ref 275–295)
PH UR: 5 [PH] (ref 5–8)
PLATELET # BLD AUTO: 300 10(3)UL (ref 150–450)
POTASSIUM SERPL-SCNC: 4.7 MMOL/L (ref 3.5–5.1)
PROT UR-MCNC: NEGATIVE MG/DL
RBC # BLD AUTO: 2.77 X10(6)UL
SODIUM SERPL-SCNC: 138 MMOL/L (ref 136–145)
SP GR UR STRIP: 1.02 (ref 1–1.03)
UROBILINOGEN UR STRIP-ACNC: <2
VIT C UR-MCNC: NEGATIVE MG/DL
WBC # BLD AUTO: 6.1 X10(3) UL (ref 4–11)

## 2022-03-23 PROCEDURE — 93971 EXTREMITY STUDY: CPT | Performed by: NURSE PRACTITIONER

## 2022-03-23 PROCEDURE — 36415 COLL VENOUS BLD VENIPUNCTURE: CPT

## 2022-03-23 PROCEDURE — 81003 URINALYSIS AUTO W/O SCOPE: CPT

## 2022-03-23 PROCEDURE — 73630 X-RAY EXAM OF FOOT: CPT | Performed by: NURSE PRACTITIONER

## 2022-03-23 PROCEDURE — 85025 COMPLETE CBC W/AUTO DIFF WBC: CPT

## 2022-03-23 PROCEDURE — 80048 BASIC METABOLIC PNL TOTAL CA: CPT

## 2022-03-23 PROCEDURE — 84550 ASSAY OF BLOOD/URIC ACID: CPT

## 2022-03-28 ENCOUNTER — OFFICE VISIT (OUTPATIENT)
Dept: PHYSICAL THERAPY | Age: 72
End: 2022-03-28
Attending: ORTHOPAEDIC SURGERY
Payer: MEDICARE

## 2022-03-28 DIAGNOSIS — Z96.652 S/P TOTAL KNEE REPLACEMENT, LEFT: ICD-10-CM

## 2022-03-28 DIAGNOSIS — M17.11 RIGHT KNEE DJD: ICD-10-CM

## 2022-03-28 PROCEDURE — 97530 THERAPEUTIC ACTIVITIES: CPT

## 2022-03-28 PROCEDURE — 97110 THERAPEUTIC EXERCISES: CPT

## 2022-03-28 PROCEDURE — 97112 NEUROMUSCULAR REEDUCATION: CPT

## 2022-03-30 ENCOUNTER — OFFICE VISIT (OUTPATIENT)
Dept: PHYSICAL THERAPY | Age: 72
End: 2022-03-30
Attending: ORTHOPAEDIC SURGERY
Payer: MEDICARE

## 2022-03-30 DIAGNOSIS — M17.11 RIGHT KNEE DJD: ICD-10-CM

## 2022-03-30 DIAGNOSIS — Z96.652 S/P TOTAL KNEE REPLACEMENT, LEFT: ICD-10-CM

## 2022-03-30 PROCEDURE — 97112 NEUROMUSCULAR REEDUCATION: CPT

## 2022-03-30 PROCEDURE — 97110 THERAPEUTIC EXERCISES: CPT

## 2022-03-30 PROCEDURE — 97140 MANUAL THERAPY 1/> REGIONS: CPT

## 2022-03-30 NOTE — PATIENT INSTRUCTIONS
Access Code: [email or web address]  URL: [employer]/  Date: 03/30/2022  Prepared by: Ai Gray    Exercises  Step Up - 1 x daily - 7 x weekly - 2 sets - 10 reps  Heel rises with counter support - 1 x daily - 7 x weekly - 2 sets - 10 reps  Standing Hip Abduction with Counter Support - 1 x daily - 7 x weekly - 2 sets - 10 reps  Standing Hip Extension with Counter Support - 1 x daily - 7 x weekly - 3 sets - 10 reps  Romberg Stance on Foam Pad with Head Rotation - 1 x daily - 7 x weekly - 1 sets - 10 reps  Romberg Stance with Head Nods on Foam Pad - 1 x daily - 7 x weekly - 1 sets - 10 reps  Romberg Stance on Foam Pad - 1 x daily - 7 x weekly - 5 reps - 30 sec hold  Romberg Stance Eyes Closed on Foam Pad - 1 x daily - 7 x weekly - 5 reps - 30 sec hold  [id number]

## 2022-04-02 ENCOUNTER — LAB REQUISITION (OUTPATIENT)
Dept: SURGERY | Age: 72
End: 2022-04-02
Payer: MEDICARE

## 2022-04-02 LAB — SARS-COV-2 RNA RESP QL NAA+PROBE: NOT DETECTED

## 2022-04-05 ENCOUNTER — APPOINTMENT (OUTPATIENT)
Dept: PHYSICAL THERAPY | Age: 72
End: 2022-04-05
Attending: ORTHOPAEDIC SURGERY
Payer: MEDICARE

## 2022-04-07 ENCOUNTER — APPOINTMENT (OUTPATIENT)
Dept: PHYSICAL THERAPY | Age: 72
End: 2022-04-07
Attending: ORTHOPAEDIC SURGERY
Payer: MEDICARE

## 2022-04-11 ENCOUNTER — APPOINTMENT (OUTPATIENT)
Dept: PHYSICAL THERAPY | Age: 72
End: 2022-04-11
Attending: ORTHOPAEDIC SURGERY
Payer: MEDICARE

## 2022-04-13 ENCOUNTER — OFFICE VISIT (OUTPATIENT)
Dept: PHYSICAL THERAPY | Age: 72
End: 2022-04-13
Attending: ORTHOPAEDIC SURGERY
Payer: MEDICARE

## 2022-04-13 DIAGNOSIS — M17.11 RIGHT KNEE DJD: ICD-10-CM

## 2022-04-13 DIAGNOSIS — Z96.652 S/P TOTAL KNEE REPLACEMENT, LEFT: ICD-10-CM

## 2022-04-13 PROCEDURE — 97110 THERAPEUTIC EXERCISES: CPT

## 2022-04-13 PROCEDURE — 97140 MANUAL THERAPY 1/> REGIONS: CPT

## 2022-04-15 ENCOUNTER — OFFICE VISIT (OUTPATIENT)
Dept: PHYSICAL THERAPY | Age: 72
End: 2022-04-15
Attending: ORTHOPAEDIC SURGERY
Payer: MEDICARE

## 2022-04-15 PROCEDURE — 97140 MANUAL THERAPY 1/> REGIONS: CPT

## 2022-04-15 PROCEDURE — 97110 THERAPEUTIC EXERCISES: CPT

## 2022-04-18 ENCOUNTER — OFFICE VISIT (OUTPATIENT)
Dept: PHYSICAL THERAPY | Age: 72
End: 2022-04-18
Attending: ORTHOPAEDIC SURGERY
Payer: MEDICARE

## 2022-04-18 ENCOUNTER — LAB REQUISITION (OUTPATIENT)
Dept: SURGERY | Age: 72
End: 2022-04-18
Payer: MEDICARE

## 2022-04-18 DIAGNOSIS — Z96.652 S/P TOTAL KNEE REPLACEMENT, LEFT: ICD-10-CM

## 2022-04-18 DIAGNOSIS — M17.11 RIGHT KNEE DJD: ICD-10-CM

## 2022-04-18 LAB — SARS-COV-2 RNA RESP QL NAA+PROBE: NOT DETECTED

## 2022-04-18 PROCEDURE — 97110 THERAPEUTIC EXERCISES: CPT

## 2022-04-18 PROCEDURE — 97140 MANUAL THERAPY 1/> REGIONS: CPT

## 2022-04-20 ENCOUNTER — APPOINTMENT (OUTPATIENT)
Dept: PHYSICAL THERAPY | Age: 72
End: 2022-04-20
Attending: ORTHOPAEDIC SURGERY
Payer: MEDICARE

## 2022-04-20 ENCOUNTER — OFFICE VISIT (OUTPATIENT)
Dept: PHYSICAL THERAPY | Age: 72
End: 2022-04-20
Attending: ORTHOPAEDIC SURGERY
Payer: MEDICARE

## 2022-04-20 PROCEDURE — 97110 THERAPEUTIC EXERCISES: CPT

## 2022-04-20 PROCEDURE — 97140 MANUAL THERAPY 1/> REGIONS: CPT

## 2022-04-25 ENCOUNTER — APPOINTMENT (OUTPATIENT)
Dept: PHYSICAL THERAPY | Age: 72
End: 2022-04-25
Attending: ORTHOPAEDIC SURGERY
Payer: MEDICARE

## 2022-04-27 ENCOUNTER — OFFICE VISIT (OUTPATIENT)
Dept: PHYSICAL THERAPY | Age: 72
End: 2022-04-27
Attending: ORTHOPAEDIC SURGERY
Payer: MEDICARE

## 2022-04-27 DIAGNOSIS — M17.11 RIGHT KNEE DJD: ICD-10-CM

## 2022-04-27 DIAGNOSIS — Z96.652 S/P TOTAL KNEE REPLACEMENT, LEFT: ICD-10-CM

## 2022-04-27 PROCEDURE — 97112 NEUROMUSCULAR REEDUCATION: CPT

## 2022-04-27 PROCEDURE — 97140 MANUAL THERAPY 1/> REGIONS: CPT

## 2022-04-27 PROCEDURE — 97110 THERAPEUTIC EXERCISES: CPT

## 2022-04-29 ENCOUNTER — OFFICE VISIT (OUTPATIENT)
Dept: PHYSICAL THERAPY | Age: 72
End: 2022-04-29
Attending: ORTHOPAEDIC SURGERY
Payer: MEDICARE

## 2022-04-29 PROCEDURE — 97140 MANUAL THERAPY 1/> REGIONS: CPT

## 2022-04-29 PROCEDURE — 97110 THERAPEUTIC EXERCISES: CPT

## 2022-04-29 PROCEDURE — 97112 NEUROMUSCULAR REEDUCATION: CPT

## 2022-05-04 ENCOUNTER — OFFICE VISIT (OUTPATIENT)
Dept: PHYSICAL THERAPY | Age: 72
End: 2022-05-04
Attending: ORTHOPAEDIC SURGERY
Payer: MEDICARE

## 2022-05-04 PROCEDURE — 97112 NEUROMUSCULAR REEDUCATION: CPT

## 2022-05-04 PROCEDURE — 97110 THERAPEUTIC EXERCISES: CPT

## 2022-05-04 PROCEDURE — 97140 MANUAL THERAPY 1/> REGIONS: CPT

## 2022-05-06 ENCOUNTER — OFFICE VISIT (OUTPATIENT)
Dept: PHYSICAL THERAPY | Age: 72
End: 2022-05-06
Attending: ORTHOPAEDIC SURGERY
Payer: MEDICARE

## 2022-05-06 PROCEDURE — 97140 MANUAL THERAPY 1/> REGIONS: CPT

## 2022-05-06 PROCEDURE — 97110 THERAPEUTIC EXERCISES: CPT

## 2022-05-11 ENCOUNTER — TELEPHONE (OUTPATIENT)
Dept: PHYSICAL THERAPY | Age: 72
End: 2022-05-11

## 2024-03-25 ENCOUNTER — OFFICE VISIT (OUTPATIENT)
Dept: OTOLARYNGOLOGY | Facility: CLINIC | Age: 74
End: 2024-03-25
Payer: MEDICARE

## 2024-03-25 DIAGNOSIS — H61.23 CERUMEN DEBRIS ON TYMPANIC MEMBRANE OF BOTH EARS: ICD-10-CM

## 2024-03-25 DIAGNOSIS — K21.9 GASTROESOPHAGEAL REFLUX DISEASE, UNSPECIFIED WHETHER ESOPHAGITIS PRESENT: Primary | ICD-10-CM

## 2024-03-25 PROCEDURE — 99203 OFFICE O/P NEW LOW 30 MIN: CPT | Performed by: SPECIALIST

## 2024-03-25 PROCEDURE — 31575 DIAGNOSTIC LARYNGOSCOPY: CPT | Performed by: SPECIALIST

## 2024-03-25 RX ORDER — FLUTICASONE PROPIONATE AND SALMETEROL 250; 50 UG/1; UG/1
1 POWDER RESPIRATORY (INHALATION) 2 TIMES DAILY
COMMUNITY
Start: 2024-03-01

## 2024-03-25 RX ORDER — OMEPRAZOLE 40 MG/1
40 CAPSULE, DELAYED RELEASE ORAL DAILY
Qty: 30 CAPSULE | Refills: 5 | Status: SHIPPED | OUTPATIENT
Start: 2024-03-25

## 2024-03-25 NOTE — PROGRESS NOTES
Linette Celaya is a 73 year old female.   Chief Complaint   Patient presents with    Sinus Problem     Reports congestion     Throat Problem     Reports coughing, reports phelm in throat. Reports chronic sore throat.      HPI:   Patient here with a chronic cough.  He has been on multiple rounds of antibiotics and steroids.    Current Outpatient Medications   Medication Sig Dispense Refill    fluticasone-salmeterol 250-50 MCG/ACT Inhalation Aerosol Powder, Breath Activated Inhale 1 puff into the lungs 2 (two) times daily.      Rosuvastatin Calcium 5 MG Oral Capsule Sprinkle       Omeprazole 40 MG Oral Capsule Delayed Release Take 1 capsule (40 mg total) by mouth daily. Before a meal 30 capsule 5    celecoxib 100 MG Oral Cap Take 1 capsule (100 mg total) by mouth 2 (two) times daily as needed for Pain. 180 capsule 0    FLUTICASONE PROPIONATE 50 MCG/ACT Nasal Suspension USE 2 sprays IN EACH NOSTRIL DAILY 16 g 1    LOSARTAN 50 MG Oral Tab TAKE ONE TABLET BY MOUTH ONE TIME DAILY 90 tablet 0    MONTELUKAST 10 MG Oral Tab TAKE ONE TABLET BY MOUTH ONE TIME DAILY 90 tablet 1    levothyroxine 100 MCG Oral Tab Take 1 tablet (100 mcg total) by mouth daily. 90 tablet 3    levocetirizine 5 MG Oral Tab Take 1 tablet (5 mg total) by mouth every evening. 90 tablet 3    ALBUTEROL SULFATE  (90 Base) MCG/ACT Inhalation Aero Soln Inhale 1-2 puffs into the lungs every 6 (six) hours as needed for Wheezing or Shortness of Breath. 8.5 g 3    Omega-3 Fatty Acids (FISH OIL) 1200 MG Oral Cap Take by mouth 2 (two) times daily.      Ergocalciferol (VITAMIN D OR) Take by mouth daily.        Multiple Vitamins-Minerals (MULTIVITAMIN OR) Take by mouth daily.        cyclobenzaprine 10 MG Oral Tab Take 1 tablet (10 mg total) by mouth 3 (three) times daily as needed for Muscle spasms. 30 tablet 0    traMADol 50 MG Oral Tab Take 50 mg by mouth.      gabapentin 300 MG Oral Cap Take 1 capsule (300 mg total) by mouth 3 (three) times daily. 30  capsule 0    HYDROCHLOROTHIAZIDE 12.5 MG Oral Cap TAKE ONE CAPSULE BY MOUTH ONE TIME DAILY 90 capsule 1      Past Medical History:   Diagnosis Date    Aortic insufficiency     Asthma, mild intermittent (HCC)     Essential hypertension     Hemorrhoids     Hypothyroid     Obesity       Social History:  Social History     Socioeconomic History    Marital status:    Tobacco Use    Smoking status: Never    Smokeless tobacco: Never   Vaping Use    Vaping Use: Never used   Substance and Sexual Activity    Alcohol use: Yes     Alcohol/week: 14.0 standard drinks of alcohol     Types: 14 Glasses of wine per week     Comment: Socially    Drug use: No   Other Topics Concern    Pt has a pacemaker No    Pt has a defibrillator No    Reaction to local anesthetic No    Caffeine Concern Yes     Comment: coffee 1-2 cups daily    Exercise No   Social History Narrative            REVIEW OF SYSTEMS:   GENERAL HEALTH: feels well otherwise  GENERAL : denies fever, chills, sweats, weight loss, weight gain  SKIN: denies any unusual skin lesions or rashes  RESPIRATORY: denies shortness of breath with exertion  NEURO: denies headaches    EXAM:   There were no vitals taken for this visit.  System Details   Skin Inspection - Normal.   Constitutional Overall appearance - Normal.   Head/Face Facial features - Normal. Eyebrows - Normal. Skull - Normal.   Eyes Conjunctiva - Right: Normal, Left: Normal. Pupil - Right: Normal, Left: Normal.    Ears Inspection - Right: Normal, Left: Normal.   Canal - Right: Normal, Left: Normal.    TM - Right: Normal, Left: Normal.   Nasal External nose - Normal.   Nasal septum - Normal.  Turbinates - Normal  Fiberoptic scope did not reveal any sinusitis.   Oral/Oropharynx Lips - Normal, Tonsils - Normal, Tongue - Normal    Neck Exam Inspection - Normal. Palpation - Normal. Parotid gland - Normal. Thyroid gland - Normal.   Lymph Detail Submental. Submandibular. Anterior cervical. Posterior cervical.  Supraclavicular.  All without enlargement   Psychiatric Orientation - Oriented to time, place, person & situation. Appropriate mood and affect.   Lungs Clear to auscultation   Neurological Memory - Normal. Cranial nerves - Cranial nerves II through XII grossly intact.   Nasopharynx Normal by fiberoptic exam   Larynx Consent was obtained for the procedure.  The nose was asesthetized with 1% neosynephrine and 4% lidocaine topical drops.    The scope was placed into the bilateral nares as well as the nasopharynx, hypopharynx and larynx.    All of which were examined.  The  entire larynx including the vallecula, epiglottis, true and false vocal cords, aryepiglottic folds, piriform sinuses and post cricord area were examined for tumors and infectious processes as well as for evidence of reflux and retained secretions.  Vocal cord mobility was also assessed.    Any abnormalities are noted in the exam section.  Posterior laryngeal erythema.  No retained secretions.  No tumors or masses seen.     ASSESSMENT AND PLAN:   1. Gastroesophageal reflux disease, unspecified whether esophagitis present  Placed on trial of omeprazole and antireflux diet.  Follow-up in 3 to 4 weeks time, sooner if problems.    2. Cerumen debris on tympanic membrane of both ears  Fully cleaned.      The patient indicates understanding of these issues and agrees to the plan.      Raina Dewitt MD  3/25/2024  11:58 AM

## 2024-03-25 NOTE — PATIENT INSTRUCTIONS
Your fiberoptic exam showed posterior laryngeal erythema most consistent with reflux disease.  You were placed on an antireflux diet and trial of omeprazole.  No greasy foods, spicy foods, chocolate, caffeine, alcohol, spearmint, peppermint, lemon, lime, orange, grapefruit, tomatoes.  Don't eat 2 hours before bedtime  Elevate the head of bed   Follow-up in 3 to 4 weeks time, sooner if problems.  Ears were also fully cleaned of cerumen.

## 2024-04-22 ENCOUNTER — OFFICE VISIT (OUTPATIENT)
Dept: OTOLARYNGOLOGY | Facility: CLINIC | Age: 74
End: 2024-04-22
Payer: MEDICARE

## 2024-04-22 VITALS — WEIGHT: 195 LBS | HEIGHT: 60 IN | BODY MASS INDEX: 38.28 KG/M2

## 2024-04-22 DIAGNOSIS — K21.9 GASTROESOPHAGEAL REFLUX DISEASE, UNSPECIFIED WHETHER ESOPHAGITIS PRESENT: Primary | ICD-10-CM

## 2024-04-22 PROCEDURE — 99213 OFFICE O/P EST LOW 20 MIN: CPT | Performed by: SPECIALIST

## 2024-04-22 RX ORDER — ESCITALOPRAM OXALATE 5 MG
TABLET ORAL
COMMUNITY
Start: 2023-06-07

## 2024-04-22 RX ORDER — OMEPRAZOLE 40 MG/1
40 CAPSULE, DELAYED RELEASE ORAL DAILY
Qty: 90 CAPSULE | Refills: 1 | Status: SHIPPED | OUTPATIENT
Start: 2024-04-22

## 2024-04-22 NOTE — PATIENT INSTRUCTIONS
You have less posterior laryngeal erythema on the omeprazole.  Continue the same and try to elevate the head of the bed up.  Follow-up in 3 months time, sooner if problems.

## 2024-04-22 NOTE — PROGRESS NOTES
Linette Celaya is a 73 year old female.   Chief Complaint   Patient presents with    Follow - Up     gastroesophageal reflux disease, unspecified whether esophagitis present     HPI:   Patient with an improvement of her throat symptoms.  Still does have an occasional cough, worse in the morning.  Phlegm and sore throat seem resolved.    Current Outpatient Medications   Medication Sig Dispense Refill    LEXAPRO 5 MG Oral Tab       Omeprazole 40 MG Oral Capsule Delayed Release Take 1 capsule (40 mg total) by mouth daily. Before a meal 90 capsule 1    fluticasone-salmeterol 250-50 MCG/ACT Inhalation Aerosol Powder, Breath Activated Inhale 1 puff into the lungs 2 (two) times daily.      Rosuvastatin Calcium 5 MG Oral Capsule Sprinkle       celecoxib 100 MG Oral Cap Take 1 capsule (100 mg total) by mouth 2 (two) times daily as needed for Pain. 180 capsule 0    FLUTICASONE PROPIONATE 50 MCG/ACT Nasal Suspension USE 2 sprays IN EACH NOSTRIL DAILY 16 g 1    LOSARTAN 50 MG Oral Tab TAKE ONE TABLET BY MOUTH ONE TIME DAILY 90 tablet 0    MONTELUKAST 10 MG Oral Tab TAKE ONE TABLET BY MOUTH ONE TIME DAILY 90 tablet 1    levothyroxine 100 MCG Oral Tab Take 1 tablet (100 mcg total) by mouth daily. 90 tablet 3    levocetirizine 5 MG Oral Tab Take 1 tablet (5 mg total) by mouth every evening. 90 tablet 3    ALBUTEROL SULFATE  (90 Base) MCG/ACT Inhalation Aero Soln Inhale 1-2 puffs into the lungs every 6 (six) hours as needed for Wheezing or Shortness of Breath. 8.5 g 3    Omega-3 Fatty Acids (FISH OIL) 1200 MG Oral Cap Take by mouth 2 (two) times daily.      Ergocalciferol (VITAMIN D OR) Take by mouth daily.        Multiple Vitamins-Minerals (MULTIVITAMIN OR) Take by mouth daily.        cyclobenzaprine 10 MG Oral Tab Take 1 tablet (10 mg total) by mouth 3 (three) times daily as needed for Muscle spasms. 30 tablet 0    traMADol 50 MG Oral Tab Take 50 mg by mouth.      gabapentin 300 MG Oral Cap Take 1 capsule (300 mg total) by  mouth 3 (three) times daily. 30 capsule 0    HYDROCHLOROTHIAZIDE 12.5 MG Oral Cap TAKE ONE CAPSULE BY MOUTH ONE TIME DAILY 90 capsule 1      Past Medical History:    Aortic insufficiency    Asthma, mild intermittent (HCC)    Essential hypertension    Hemorrhoids    Hypothyroid    Obesity      Social History:  Social History     Socioeconomic History    Marital status:    Tobacco Use    Smoking status: Never    Smokeless tobacco: Never   Vaping Use    Vaping status: Never Used   Substance and Sexual Activity    Alcohol use: Yes     Alcohol/week: 14.0 standard drinks of alcohol     Types: 14 Glasses of wine per week     Comment: Socially    Drug use: No   Other Topics Concern    Pt has a pacemaker No    Pt has a defibrillator No    Reaction to local anesthetic No    Caffeine Concern Yes     Comment: coffee 1-2 cups daily    Exercise No   Social History Narrative         Social Determinants of Health      Received [facility], [facility]    Housing Stability        REVIEW OF SYSTEMS:   GENERAL HEALTH: feels well otherwise  GENERAL : denies fever, chills, sweats, weight loss, weight gain  SKIN: denies any unusual skin lesions or rashes  RESPIRATORY: denies shortness of breath with exertion  NEURO: denies headaches    EXAM:   Ht 5' (1.524 m)   Wt 195 lb (88.5 kg)   BMI 38.08 kg/m²   System Details   Skin Inspection - Normal.   Constitutional Overall appearance - Normal.   Head/Face Facial features - Normal. Eyebrows - Normal. Skull - Normal.   Eyes Conjunctiva - Right: Normal, Left: Normal. Pupil - Right: Normal, Left: Normal.    Ears Inspection - Right: Normal, Left: Normal.   Canal - Right: Normal, Left: Normal.    TM - Right: Normal, Left: Normal.   Nasal External nose - Normal.   Nasal septum - Normal.  Turbinates - Normal   Oral/Oropharynx Lips - Normal, Tonsils - Normal, Tongue - Normal    Neck Exam Inspection - Normal. Palpation - Normal. Parotid gland - Normal.  Thyroid gland - Normal.   Lymph Detail Submental. Submandibular. Anterior cervical. Posterior cervical. Supraclavicular.  All without enlargement   Psychiatric Orientation - Oriented to time, place, person & situation. Appropriate mood and affect.   Neurological Memory - Normal. Cranial nerves - Cranial nerves II through XII grossly intact.   Larynx Mirror examination with less posterior laryngeal erythema.  No retained secretions.  No tumors or masses seen.  Normal vocal cord mobility.     ASSESSMENT AND PLAN:   1. Gastroesophageal reflux disease, unspecified whether esophagitis present  Continue omeprazole and antireflux diet.  Try to elevate the head of the bed up.  Follow-up in 3 months time, sooner if problems.        The patient indicates understanding of these issues and agrees to the plan.      Raina Dewitt MD  4/22/2024  12:41 PM

## 2024-07-18 ENCOUNTER — OFFICE VISIT (OUTPATIENT)
Dept: OTOLARYNGOLOGY | Facility: CLINIC | Age: 74
End: 2024-07-18
Payer: MEDICARE

## 2024-07-18 VITALS — HEIGHT: 60 IN | WEIGHT: 195 LBS | BODY MASS INDEX: 38.28 KG/M2

## 2024-07-18 DIAGNOSIS — K21.9 GASTROESOPHAGEAL REFLUX DISEASE, UNSPECIFIED WHETHER ESOPHAGITIS PRESENT: Primary | ICD-10-CM

## 2024-07-18 PROCEDURE — 99213 OFFICE O/P EST LOW 20 MIN: CPT | Performed by: SPECIALIST

## 2024-07-19 NOTE — PROGRESS NOTES
Linette Celaya is a 73 year old female.   Chief Complaint   Patient presents with    Follow - Up     gastroesophageal reflux disease, unspecified whether esophagitis present     HPI:   Patient with marked improvement in her reflux symptoms.  He has been on omeprazole and an antireflux diet.    Current Outpatient Medications   Medication Sig Dispense Refill    LEXAPRO 5 MG Oral Tab       Omeprazole 40 MG Oral Capsule Delayed Release Take 1 capsule (40 mg total) by mouth daily. Before a meal 90 capsule 1    fluticasone-salmeterol 250-50 MCG/ACT Inhalation Aerosol Powder, Breath Activated Inhale 1 puff into the lungs 2 (two) times daily.      Rosuvastatin Calcium 5 MG Oral Capsule Sprinkle       celecoxib 100 MG Oral Cap Take 1 capsule (100 mg total) by mouth 2 (two) times daily as needed for Pain. 180 capsule 0    FLUTICASONE PROPIONATE 50 MCG/ACT Nasal Suspension USE 2 sprays IN EACH NOSTRIL DAILY 16 g 1    LOSARTAN 50 MG Oral Tab TAKE ONE TABLET BY MOUTH ONE TIME DAILY 90 tablet 0    MONTELUKAST 10 MG Oral Tab TAKE ONE TABLET BY MOUTH ONE TIME DAILY 90 tablet 1    levothyroxine 100 MCG Oral Tab Take 1 tablet (100 mcg total) by mouth daily. 90 tablet 3    levocetirizine 5 MG Oral Tab Take 1 tablet (5 mg total) by mouth every evening. 90 tablet 3    ALBUTEROL SULFATE  (90 Base) MCG/ACT Inhalation Aero Soln Inhale 1-2 puffs into the lungs every 6 (six) hours as needed for Wheezing or Shortness of Breath. 8.5 g 3    Omega-3 Fatty Acids (FISH OIL) 1200 MG Oral Cap Take by mouth 2 (two) times daily.      Ergocalciferol (VITAMIN D OR) Take by mouth daily.        Multiple Vitamins-Minerals (MULTIVITAMIN OR) Take by mouth daily.        cyclobenzaprine 10 MG Oral Tab Take 1 tablet (10 mg total) by mouth 3 (three) times daily as needed for Muscle spasms. 30 tablet 0    traMADol 50 MG Oral Tab Take 50 mg by mouth.      gabapentin 300 MG Oral Cap Take 1 capsule (300 mg total) by mouth 3 (three) times daily. 30 capsule 0     HYDROCHLOROTHIAZIDE 12.5 MG Oral Cap TAKE ONE CAPSULE BY MOUTH ONE TIME DAILY 90 capsule 1      Past Medical History:    Aortic insufficiency    Asthma, mild intermittent (HCC)    Essential hypertension    Hemorrhoids    Hypothyroid    Obesity      Social History:  Social History     Socioeconomic History    Marital status:    Tobacco Use    Smoking status: Never    Smokeless tobacco: Never   Vaping Use    Vaping status: Never Used   Substance and Sexual Activity    Alcohol use: Yes     Alcohol/week: 14.0 standard drinks of alcohol     Types: 14 Glasses of wine per week     Comment: Socially    Drug use: No   Other Topics Concern    Pt has a pacemaker No    Pt has a defibrillator No    Reaction to local anesthetic No    Caffeine Concern Yes     Comment: coffee 1-2 cups daily    Exercise No   Social History Narrative         Social Determinants of Health      Received from [facility], [facility]    Housing Stability        REVIEW OF SYSTEMS:   GENERAL HEALTH: feels well otherwise  GENERAL : denies fever, chills, sweats, weight loss, weight gain  SKIN: denies any unusual skin lesions or rashes  RESPIRATORY: denies shortness of breath with exertion  NEURO: denies headaches    EXAM:   Ht 5' (1.524 m)   Wt 195 lb (88.5 kg)   BMI 38.08 kg/m²   System Details   Skin Inspection - Normal.   Constitutional Overall appearance - Normal.   Head/Face Facial features - Normal. Eyebrows - Normal. Skull - Normal.   Eyes Conjunctiva - Right: Normal, Left: Normal. Pupil - Right: Normal, Left: Normal.    Ears Inspection - Right: Normal, Left: Normal.   Canal - Right: Normal, Left: Normal.   TM - Right: Normal, Left: Normal.   Nasal External nose - Normal.   Nasal septum - Normal.  Turbinates - Normal.   Oral/Oropharynx Lips - Normal, Tonsils - Normal, Tongue - Normal    Neck Exam Inspection - Normal. Palpation - Normal. Parotid gland - Normal. Thyroid gland - Normal.   Lymph Detail  Submental. Submandibular. Anterior cervical. Posterior cervical. Supraclavicular all without enlargement   Larynx Mirror examination with less posterior laryngeal erythema.  No retained secretions.  No tumors or masses seen.   Psychiatric Orientation - Oriented to time, place, person & situation. Appropriate mood and affect.   Neurological Memory - Normal. Cranial nerves - Cranial nerves II through XII grossly intact.     ASSESSMENT AND PLAN:   1. Gastroesophageal reflux disease, unspecified whether esophagitis present  Continue omeprazole and antireflux diet.  Follow-up in 6 months time, sooner if problems.        The patient indicates understanding of these issues and agrees to the plan.      Raina Dewitt MD  7/18/2024  10:09 PM

## 2024-07-19 NOTE — PATIENT INSTRUCTIONS
Continue omeprazole and antireflux diet for your reflux disease.  Markedly less posterior laryngeal erythema.  Follow-up in 6 months time, sooner if problems.

## 2024-09-27 RX ORDER — OMEPRAZOLE 40 MG/1
40 CAPSULE, DELAYED RELEASE ORAL DAILY
Qty: 90 CAPSULE | Refills: 0 | Status: SHIPPED | OUTPATIENT
Start: 2024-09-27

## 2024-11-16 ENCOUNTER — OFFICE VISIT (OUTPATIENT)
Dept: DERMATOLOGY CLINIC | Facility: CLINIC | Age: 74
End: 2024-11-16
Payer: MEDICARE

## 2024-11-16 DIAGNOSIS — L81.4 LENTIGO: ICD-10-CM

## 2024-11-16 DIAGNOSIS — D23.9 BENIGN NEOPLASM OF SKIN, UNSPECIFIED LOCATION: ICD-10-CM

## 2024-11-16 DIAGNOSIS — D22.9 MULTIPLE NEVI: ICD-10-CM

## 2024-11-16 DIAGNOSIS — L82.1 SK (SEBORRHEIC KERATOSIS): Primary | ICD-10-CM

## 2024-11-16 DIAGNOSIS — Z12.83 SKIN CANCER SCREENING: ICD-10-CM

## 2024-11-16 PROCEDURE — 99203 OFFICE O/P NEW LOW 30 MIN: CPT | Performed by: DERMATOLOGY

## 2024-11-16 RX ORDER — AMMONIUM LACTATE 12 G/100G
CREAM TOPICAL
Qty: 385 G | Refills: 11 | Status: SHIPPED | OUTPATIENT
Start: 2024-11-16

## 2024-11-16 NOTE — PROGRESS NOTES
Linette Celaya is a 73 year old female.  HPI:     CC:    Chief Complaint   Patient presents with    Full Skin Exam     \"New Patient\" with Hx of Sk's presents for FBSE. Has c/o crusty lesion on R cheek. There is also another spots on R forearm. Denies personal Hx of skin cancer. Family Hx of unknown type(Parents).         Allergies:  Synvisc [hylan g-f 20]    HISTORY:    Past Medical History:    Aortic insufficiency    Asthma, mild intermittent (HCC)    Essential hypertension    Hemorrhoids    Hypothyroid    Obesity      Past Surgical History:   Procedure Laterality Date    Arthroscopy, knee, surgical; w/lateral release Bilateral           Colonoscopy  2012    D & c        Family History   Problem Relation Age of Onset    Basal Cell Father     Hypertension Father     Heart Disorder Father     Basal Cell Mother     Cancer Mother     Diabetes Paternal Grandmother       Social History     Socioeconomic History    Marital status:    Tobacco Use    Smoking status: Never    Smokeless tobacco: Never   Vaping Use    Vaping status: Never Used   Substance and Sexual Activity    Alcohol use: Yes     Alcohol/week: 14.0 standard drinks of alcohol     Types: 14 Glasses of wine per week     Comment: Socially    Drug use: No   Other Topics Concern    History of tanning Yes    Pt has a pacemaker No    Pt has a defibrillator No    Reaction to local anesthetic No    Caffeine Concern Yes     Comment: coffee 1-2 cups daily    Exercise No   Social History Narrative         Social Drivers of Health      Received from [facility], [facility]    Housing Stability        Current Outpatient Medications   Medication Sig Dispense Refill    Ammonium Lactate 12 % External Cream Apply to dry skin bid as directed 385 g 11    OMEPRAZOLE 40 MG Oral Capsule Delayed Release Take 1 capsule (40 mg total) by mouth daily. Before a meal 90 capsule 0    LEXAPRO 5 MG Oral Tab        fluticasone-salmeterol 250-50 MCG/ACT Inhalation Aerosol Powder, Breath Activated Inhale 1 puff into the lungs 2 (two) times daily.      Rosuvastatin Calcium 5 MG Oral Capsule Sprinkle       celecoxib 100 MG Oral Cap Take 1 capsule (100 mg total) by mouth 2 (two) times daily as needed for Pain. 180 capsule 0    FLUTICASONE PROPIONATE 50 MCG/ACT Nasal Suspension USE 2 sprays IN EACH NOSTRIL DAILY 16 g 1    LOSARTAN 50 MG Oral Tab TAKE ONE TABLET BY MOUTH ONE TIME DAILY 90 tablet 0    MONTELUKAST 10 MG Oral Tab TAKE ONE TABLET BY MOUTH ONE TIME DAILY 90 tablet 1    levothyroxine 100 MCG Oral Tab Take 1 tablet (100 mcg total) by mouth daily. 90 tablet 3    levocetirizine 5 MG Oral Tab Take 1 tablet (5 mg total) by mouth every evening. 90 tablet 3    ALBUTEROL SULFATE  (90 Base) MCG/ACT Inhalation Aero Soln Inhale 1-2 puffs into the lungs every 6 (six) hours as needed for Wheezing or Shortness of Breath. 8.5 g 3    Omega-3 Fatty Acids (FISH OIL) 1200 MG Oral Cap Take by mouth 2 (two) times daily.      Ergocalciferol (VITAMIN D OR) Take by mouth daily.        Multiple Vitamins-Minerals (MULTIVITAMIN OR) Take by mouth daily.        cyclobenzaprine 10 MG Oral Tab Take 1 tablet (10 mg total) by mouth 3 (three) times daily as needed for Muscle spasms. 30 tablet 0    traMADol 50 MG Oral Tab Take 50 mg by mouth.      gabapentin 300 MG Oral Cap Take 1 capsule (300 mg total) by mouth 3 (three) times daily. 30 capsule 0    HYDROCHLOROTHIAZIDE 12.5 MG Oral Cap TAKE ONE CAPSULE BY MOUTH ONE TIME DAILY 90 capsule 1     Allergies:   Allergies   Allergen Reactions    Synvisc [Hylan G-F 20] SWELLING and PAIN       Past Medical History:    Aortic insufficiency    Asthma, mild intermittent (HCC)    Essential hypertension    Hemorrhoids    Hypothyroid    Obesity     Past Surgical History:   Procedure Laterality Date    Arthroscopy, knee, surgical; w/lateral release Bilateral           Colonoscopy  2012    D & c        Social History     Socioeconomic History    Marital status:      Spouse name: Not on file    Number of children: Not on file    Years of education: Not on file    Highest education level: Not on file   Occupational History    Not on file   Tobacco Use    Smoking status: Never    Smokeless tobacco: Never   Vaping Use    Vaping status: Never Used   Substance and Sexual Activity    Alcohol use: Yes     Alcohol/week: 14.0 standard drinks of alcohol     Types: 14 Glasses of wine per week     Comment: Socially    Drug use: No    Sexual activity: Not on file   Other Topics Concern    Grew up on a farm Not Asked    History of tanning Yes    Outdoor occupation Not Asked    Pt has a pacemaker No    Pt has a defibrillator No    Breast feeding Not Asked    Reaction to local anesthetic No     Service Not Asked    Blood Transfusions Not Asked    Caffeine Concern Yes     Comment: coffee 1-2 cups daily    Occupational Exposure Not Asked    Hobby Hazards Not Asked    Sleep Concern Not Asked    Stress Concern Not Asked    Weight Concern Not Asked    Special Diet Not Asked    Back Care Not Asked    Exercise No    Bike Helmet Not Asked    Seat Belt Not Asked    Self-Exams Not Asked   Social History Narrative         Social Drivers of Health     Financial Resource Strain: Not on file   Food Insecurity: Not on file   Transportation Needs: Not on file   Physical Activity: Not on file   Stress: Not on file   Social Connections: Not on file   Housing Stability: Low Risk  (7/26/2021)    Received from [facility], [facility]    Housing Stability     Mortgage Payment Concerns?: Not on file     Number of Places Lived in the Last Year: Not on file     Unstable Housing?: Not on file     Family History   Problem Relation Age of Onset    Basal Cell Father     Hypertension Father     Heart Disorder Father     Basal Cell Mother     Cancer Mother     Diabetes Paternal Grandmother        There  were no vitals filed for this visit.    HPI:    Chief Complaint   Patient presents with    Full Skin Exam     \"New Patient\" with Hx of Sk's presents for FBSE. Has c/o crusty lesion on R cheek. There is also another spots on R forearm. Denies personal Hx of skin cancer. Family Hx of unknown type(Parents).     For skin check.  History of CNH.  Stable  Patient presents with concerns above.    Past notes/ records and appropriate/relevant lab results including pathology and past body maps reviewed. Updated and new information noted in current visit.       Patient has been in their usual state of health.  History, medications, allergies reviewed as noted.      ROS:  Denies any other systemic complaints.  No new or changeing lesions other than noted above. No fevers, chills, night sweats, unusual sun sensitivity.  No other skin complaints.        History, medications, allergies reviewed as noted.       Physical Examination:     Well-developed well-nourished patient alert oriented in no acute distress.  Exam performed, including scalp, head, neck, face,back, chest  abdomen, arms, legs   Multiple light to medium brown, well marginated,   uniformly pigmented, macules and papules 6 mm and less scattered on exam. pigmented lesions examined with dermoscopy benign-appearing patterns.     Waxy tannish keratotic papules scattered, cherry-red vascular papules scattered.    See map today's date for lesions noted .      Otherwise remarkable for lesions as noted on map.  See details of examination  See Assessment /Plan for additional history and physical exam also:    Assessment / plan:    No orders of the defined types were placed in this encounter.      Meds & Refills for this Visit:  Requested Prescriptions     Signed Prescriptions Disp Refills    Ammonium Lactate 12 % External Cream 385 g 11     Sig: Apply to dry skin bid as directed         Encounter Diagnoses   Name Primary?    SK (seborrheic keratosis) Yes    Lentigo     Benign  neoplasm of skin, unspecified location     Multiple nevi     Skin cancer screening        See details on map.      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    Family history BCC continue, careful sun protection.  Annual follow-up.  Skin care regimen discussed.  Reassurance regarding benign keratoses.    Extensive benign keratoses scattered over chest shoulders back lentigines over the arms hands.  No suspicious lesions  Alphahydroxy acids including lactic acid, glycolic acid, urea, salicylic acid may be helpful.  Suggest over-the-counter products such as Goldbond psoriasis cream 3% salicylic acid, rough and bumpy, Cerave SA cream, AmLactin, and others as available with these ingredients.  Lactic acid  May use over the forehead    Other benign nevi lentigines keratoses no significant changes numerous benign keratoses.     No other susupicious lesions on todays  exam.    Family history of BCC continue regular follow-up  Please refer to map for specific lesions.  See additional diagnoses.  Pros cons of various therapies, risks benefits discussed.Pathophysiology discussed with patient.  Therapeutic options reviewed.  See  Medications in grid.  Instructions reviewed at length.    Benign nevi, seborrheic  keratoses, cherry angiomas:  Reassurance regarding other benign skin lesions.Signs and symptoms of skin cancer, ABCDE's of melanoma discussed with patient. Sunscreen use, sun protection, self exams reviewed.  Followup as noted RTC routine checkup 6 mos - one year or p.r.n.    The patient indicates understanding of these issues and agrees to the plan.  The patient is asked to return as noted in follow-up/ above.    This note was generated using Dragon voice recognition software.  Please contact me regarding any confusion resulting from errors in recognition.   Note to patient and family: The  21st Century Cures Act makes medical notes like these available to patients. However, be advised this is a medical document. It is intended as [id number] communication and monitoring of a patient's care needs. It is written in medical language and may contain abbreviations or verbiage that are unfamiliar. It may appear blunt or direct. Medical documents are intended to carry relevant information, facts as evident and the clinical opinion of the practitioner.

## 2024-12-30 NOTE — PROGRESS NOTES
Dx:     Spondylolisthesis at L4-L5 level (M43.16)     Insurance (Authorized # of Visits):  Medicare [employer] PPO; 12 visits; cert ends 4/38/6804       Authorizing Physician: Dr. Chip Bradley MD visit: none scheduled  Fall Risk: elevated         Precautions: have trunk flex and ext ROM WFL to bend forward to pick items up from the floor without difficulty. 4. Patient will have at least 4+/5 MMT grade strength to perform sit to stand transfer with good motor control and without signs of imbalance.    5. Patient pattern; engaging gluteals x 3 min  Sitting: HS curls with tband; 10x2 Neuro Re-ed  Diaphragmatic breathing in sitting x5 min  Sitting: HS curls with tband; 10x2  Pelvic tilts in sitting ; 10x2   Neuro Re-ed  Diaphragmatic breathing in sitting x5 min  Gait 50

## 2025-01-16 ENCOUNTER — OFFICE VISIT (OUTPATIENT)
Dept: OTOLARYNGOLOGY | Facility: CLINIC | Age: 75
End: 2025-01-16
Payer: MEDICARE

## 2025-01-16 DIAGNOSIS — K21.9 GASTROESOPHAGEAL REFLUX DISEASE, UNSPECIFIED WHETHER ESOPHAGITIS PRESENT: ICD-10-CM

## 2025-01-16 DIAGNOSIS — R05.2 SUBACUTE COUGH: Primary | ICD-10-CM

## 2025-01-16 PROCEDURE — 99213 OFFICE O/P EST LOW 20 MIN: CPT | Performed by: SPECIALIST

## 2025-01-16 PROCEDURE — 31575 DIAGNOSTIC LARYNGOSCOPY: CPT | Performed by: SPECIALIST

## 2025-01-17 NOTE — PATIENT INSTRUCTIONS
Continue omeprazole and antireflux diet.  No other abnormalities except for slight erythema on your fiberoptic exam.  Lungs were clear to auscultation.  Follow-up in 6 months time, sooner if problems.

## 2025-01-17 NOTE — PROGRESS NOTES
Linette Celaya is a 74 year old female.   Chief Complaint   Patient presents with    Follow - Up     Gastroesophageal reflux disease X 6 month      HPI:   Patient here had a cold and has been coughing since mid December.  Has been taking daily omeprazole for reflux disease.    Current Outpatient Medications   Medication Sig Dispense Refill    Ammonium Lactate 12 % External Cream Apply to dry skin bid as directed 385 g 11    OMEPRAZOLE 40 MG Oral Capsule Delayed Release Take 1 capsule (40 mg total) by mouth daily. Before a meal 90 capsule 0    LEXAPRO 5 MG Oral Tab       fluticasone-salmeterol 250-50 MCG/ACT Inhalation Aerosol Powder, Breath Activated Inhale 1 puff into the lungs 2 (two) times daily.      Rosuvastatin Calcium 5 MG Oral Capsule Sprinkle       celecoxib 100 MG Oral Cap Take 1 capsule (100 mg total) by mouth 2 (two) times daily as needed for Pain. 180 capsule 0    FLUTICASONE PROPIONATE 50 MCG/ACT Nasal Suspension USE 2 sprays IN EACH NOSTRIL DAILY 16 g 1    LOSARTAN 50 MG Oral Tab TAKE ONE TABLET BY MOUTH ONE TIME DAILY 90 tablet 0    MONTELUKAST 10 MG Oral Tab TAKE ONE TABLET BY MOUTH ONE TIME DAILY 90 tablet 1    levothyroxine 100 MCG Oral Tab Take 1 tablet (100 mcg total) by mouth daily. 90 tablet 3    levocetirizine 5 MG Oral Tab Take 1 tablet (5 mg total) by mouth every evening. 90 tablet 3    ALBUTEROL SULFATE  (90 Base) MCG/ACT Inhalation Aero Soln Inhale 1-2 puffs into the lungs every 6 (six) hours as needed for Wheezing or Shortness of Breath. 8.5 g 3    Omega-3 Fatty Acids (FISH OIL) 1200 MG Oral Cap Take by mouth 2 (two) times daily.      Ergocalciferol (VITAMIN D OR) Take by mouth daily.        Multiple Vitamins-Minerals (MULTIVITAMIN OR) Take by mouth daily.        cyclobenzaprine 10 MG Oral Tab Take 1 tablet (10 mg total) by mouth 3 (three) times daily as needed for Muscle spasms. 30 tablet 0    traMADol 50 MG Oral Tab Take 50 mg by mouth.      gabapentin 300 MG Oral Cap Take 1  capsule (300 mg total) by mouth 3 (three) times daily. 30 capsule 0    HYDROCHLOROTHIAZIDE 12.5 MG Oral Cap TAKE ONE CAPSULE BY MOUTH ONE TIME DAILY 90 capsule 1      Past Medical History:    Aortic insufficiency    Asthma, mild intermittent (HCC)    Essential hypertension    Hemorrhoids    Hypothyroid    Obesity      Social History:  Social History     Socioeconomic History    Marital status:    Tobacco Use    Smoking status: Never    Smokeless tobacco: Never   Vaping Use    Vaping status: Never Used   Substance and Sexual Activity    Alcohol use: Not Currently     Alcohol/week: 14.0 standard drinks of alcohol     Types: 14 Glasses of wine per week     Comment: Socially    Drug use: No   Other Topics Concern    History of tanning Yes    Pt has a pacemaker No    Pt has a defibrillator No    Reaction to local anesthetic No    Caffeine Concern Yes     Comment: coffee 1-2 cups daily    Exercise No   Social History Narrative         Social Drivers of Health      [facility]    Housing Stability        REVIEW OF SYSTEMS:   GENERAL HEALTH: feels well otherwise  GENERAL : denies fever, chills, sweats, weight loss, weight gain  SKIN: denies any unusual skin lesions or rashes  RESPIRATORY: denies shortness of breath with exertion  NEURO: denies headaches    EXAM:   There were no vitals taken for this visit.  System Details   Skin Inspection - Normal.   Constitutional Overall appearance - Normal.   Head/Face Facial features - Normal. Eyebrows - Normal. Skull - Normal.   Eyes Conjunctiva - Right: Normal, Left: Normal. Pupil - Right: Normal, Left: Normal.    Ears Inspection - Right: Normal, Left: Normal.   Canal - Right: Normal, Left: Normal.    TM - Right: Normal, Left: Normal.   Nasal External nose - Normal.   Nasal septum - Normal.  Turbinates - Normal.  No sinusitis by fiberoptic exam   Oral/Oropharynx Lips - Normal, Tonsils - Normal, Tongue - Normal     Neck Exam Inspection - Normal. Palpation - Normal. Parotid gland - Normal. Thyroid gland - Normal.   Lymph Detail Submental. Submandibular. Anterior cervical. Posterior cervical. Supraclavicular.  All without enlargement   Psychiatric Orientation - Oriented to time, place, person & situation. Appropriate mood and affect.   Lungs Clear to auscultation   Neurological Memory - Normal. Cranial nerves - Cranial nerves II through XII grossly intact.   Nasopharynx Normal by fiberoptic exam   Larynx Consent was obtained for the procedure.  The nose was asesthetized with 1% neosynephrine and 4% lidocaine topical drops.    The scope was placed into the bilateral nares as well as the nasopharynx, hypopharynx and larynx.    All of which were examined.  The  entire larynx including the vallecula, epiglottis, true and false vocal cords, aryepiglottic folds, piriform sinuses and post cricord area were examined for tumors and infectious processes as well as for evidence of reflux and retained secretions.  Vocal cord mobility was also assessed.    Any abnormalities are noted in the exam section.  Slight posterior laryngeal erythema on the left.  No retained secretions.  No tumors or masses seen.  Normal vocal cord mobility.     ASSESSMENT AND PLAN:   1. Subacute cough  Lungs clear to auscultation.    2. Gastroesophageal reflux disease, unspecified whether esophagitis present  Continue omeprazole and antireflux diet.  Patient is currently stopped all alcohol.  Patient to follow-up in 6 months time, sooner if problems.      The patient indicates understanding of these issues and agrees to the plan.      Raina Dewitt MD  1/16/2025  9:52 PM

## 2025-07-17 ENCOUNTER — OFFICE VISIT (OUTPATIENT)
Dept: OTOLARYNGOLOGY | Facility: CLINIC | Age: 75
End: 2025-07-17
Payer: MEDICARE

## 2025-07-17 VITALS — HEIGHT: 60 IN | BODY MASS INDEX: 33.96 KG/M2 | WEIGHT: 173 LBS

## 2025-07-17 DIAGNOSIS — K21.9 GASTROESOPHAGEAL REFLUX DISEASE, UNSPECIFIED WHETHER ESOPHAGITIS PRESENT: ICD-10-CM

## 2025-07-17 DIAGNOSIS — R05.2 SUBACUTE COUGH: Primary | ICD-10-CM

## 2025-07-17 PROCEDURE — 99213 OFFICE O/P EST LOW 20 MIN: CPT | Performed by: SPECIALIST

## 2025-07-17 NOTE — PATIENT INSTRUCTIONS
Please continue with your antireflux diet.  Change omeprazole to every other day.  Follow-up in 6 months time, sooner if problems.

## 2025-07-17 NOTE — PROGRESS NOTES
Linette Celaya is a 74 year old female.   Chief Complaint   Patient presents with    Follow - Up     subacute cough     HPI:   Patient here has an improvement in her cough as well as reflux symptoms.  He has been using daily omeprazole.  Did unfortunately start to drink alcohol again.  Is currently enrolled in [email or web address].    Current Medications[1]   Past Medical History[2]   Social History:  Short Social Hx on File[3]     REVIEW OF SYSTEMS:   GENERAL HEALTH: feels well otherwise  GENERAL : denies fever, chills, sweats, weight loss, weight gain  SKIN: denies any unusual skin lesions or rashes  RESPIRATORY: denies shortness of breath with exertion  NEURO: denies headaches    EXAM:   Ht 5' (1.524 m)   Wt 173 lb (78.5 kg)   BMI 33.79 kg/m²   System Details   Skin Inspection - Normal.   Constitutional Overall appearance - Normal.   Head/Face Facial features - Normal. Eyebrows - Normal. Skull - Normal.   Eyes Conjunctiva - Right: Normal, Left: Normal. Pupil - Right: Normal, Left: Normal.    Ears Inspection - Right: Normal, Left: Normal.   Canal - Right: Normal, Left: Normal.    TM - Right: Normal, Left: Normal.   Nasal External nose - Normal.   Nasal septum - Normal.  Turbinates - Normal   Oral/Oropharynx Lips - Normal, Tonsils - Normal, Tongue - Normal    Neck Exam Inspection - Normal. Palpation - Normal. Parotid gland - Normal. Thyroid gland - Normal.   Lymph Detail Submental. Submandibular. Anterior cervical. Posterior cervical. Supraclavicular.  All without enlargement   Psychiatric Orientation - Oriented to time, place, person & situation. Appropriate mood and affect.   Neurological Memory - Normal. Cranial nerves - Cranial nerves II through XII grossly intact.   Larynx Mirror examination with slight posterior laryngeal erythema and normal vocal cord mobility.  Anterior larynx not well-visualized by mirror examination.     ASSESSMENT AND PLAN:   1. Subacute cough  Improved with omeprazole.  Patient to change omeprazole to  every other day.    2. Gastroesophageal reflux disease, unspecified whether esophagitis present  Continue antireflux diet.  Follow-up in 6 months time, sooner if problems.      The patient indicates understanding of these issues and agrees to the plan.      Raina Dewitt MD  7/17/2025  4:02 PM       [1]   Current Outpatient Medications   Medication Sig Dispense Refill    Ammonium Lactate 12 % External Cream Apply to dry skin bid as directed 385 g 11    OMEPRAZOLE 40 MG Oral Capsule Delayed Release Take 1 capsule (40 mg total) by mouth daily. Before a meal 90 capsule 0    LEXAPRO 5 MG Oral Tab       fluticasone-salmeterol 250-50 MCG/ACT Inhalation Aerosol Powder, Breath Activated Inhale 1 puff into the lungs 2 (two) times daily.      Rosuvastatin Calcium 5 MG Oral Capsule Sprinkle       celecoxib 100 MG Oral Cap Take 1 capsule (100 mg total) by mouth 2 (two) times daily as needed for Pain. 180 capsule 0    FLUTICASONE PROPIONATE 50 MCG/ACT Nasal Suspension USE 2 sprays IN EACH NOSTRIL DAILY 16 g 1    LOSARTAN 50 MG Oral Tab TAKE ONE TABLET BY MOUTH ONE TIME DAILY 90 tablet 0    MONTELUKAST 10 MG Oral Tab TAKE ONE TABLET BY MOUTH ONE TIME DAILY 90 tablet 1    levothyroxine 100 MCG Oral Tab Take 1 tablet (100 mcg total) by mouth daily. 90 tablet 3    levocetirizine 5 MG Oral Tab Take 1 tablet (5 mg total) by mouth every evening. 90 tablet 3    ALBUTEROL SULFATE  (90 Base) MCG/ACT Inhalation Aero Soln Inhale 1-2 puffs into the lungs every 6 (six) hours as needed for Wheezing or Shortness of Breath. 8.5 g 3    Omega-3 Fatty Acids (FISH OIL) 1200 MG Oral Cap Take by mouth 2 (two) times daily.      Ergocalciferol (VITAMIN D OR) Take by mouth daily.        Multiple Vitamins-Minerals (MULTIVITAMIN OR) Take by mouth daily.        cyclobenzaprine 10 MG Oral Tab Take 1 tablet (10 mg total) by mouth 3 (three) times daily as needed for Muscle spasms. 30 tablet 0    traMADol 50 MG Oral Tab Take 50 mg by mouth.       gabapentin 300 MG Oral Cap Take 1 capsule (300 mg total) by mouth 3 (three) times daily. 30 capsule 0    HYDROCHLOROTHIAZIDE 12.5 MG Oral Cap TAKE ONE CAPSULE BY MOUTH ONE TIME DAILY 90 capsule 1   [2]   Past Medical History:   Aortic insufficiency    Asthma, mild intermittent (HCC)    Essential hypertension    Hemorrhoids    Hypothyroid    Obesity   [3]   Social History  Socioeconomic History    Marital status:    Tobacco Use    Smoking status: Never    Smokeless tobacco: Never   Vaping Use    Vaping status: Never Used   Substance and Sexual Activity    Alcohol use: Not Currently     Alcohol/week: 14.0 standard drinks of alcohol     Types: 14 Glasses of wine per week     Comment: Socially    Drug use: No   Other Topics Concern    History of tanning Yes    Pt has a pacemaker No    Pt has a defibrillator No    Reaction to local anesthetic No    Caffeine Concern Yes     Comment: coffee 1-2 cups daily    Exercise No   Social History Narrative         Social Drivers of Health      Received from [facility]    Housing Stability

## 2025-07-24 ENCOUNTER — OFFICE VISIT (OUTPATIENT)
Dept: DERMATOLOGY CLINIC | Facility: CLINIC | Age: 75
End: 2025-07-24
Payer: MEDICARE

## 2025-07-24 DIAGNOSIS — L81.4 LENTIGO: ICD-10-CM

## 2025-07-24 DIAGNOSIS — D22.9 MULTIPLE NEVI: ICD-10-CM

## 2025-07-24 DIAGNOSIS — Z12.83 SKIN CANCER SCREENING: ICD-10-CM

## 2025-07-24 DIAGNOSIS — L57.0 AK (ACTINIC KERATOSIS): ICD-10-CM

## 2025-07-24 DIAGNOSIS — D48.5 NEOPLASM OF UNCERTAIN BEHAVIOR OF SKIN: Primary | ICD-10-CM

## 2025-07-24 DIAGNOSIS — L82.1 SK (SEBORRHEIC KERATOSIS): ICD-10-CM

## 2025-07-24 DIAGNOSIS — D23.9 BENIGN NEOPLASM OF SKIN, UNSPECIFIED LOCATION: ICD-10-CM

## 2025-07-24 PROCEDURE — 17003 DESTRUCT PREMALG LES 2-14: CPT | Performed by: DERMATOLOGY

## 2025-07-24 PROCEDURE — 11102 TANGNTL BX SKIN SINGLE LES: CPT | Performed by: DERMATOLOGY

## 2025-07-24 PROCEDURE — 17000 DESTRUCT PREMALG LESION: CPT | Performed by: DERMATOLOGY

## 2025-07-24 PROCEDURE — 99213 OFFICE O/P EST LOW 20 MIN: CPT | Performed by: DERMATOLOGY

## 2025-07-24 PROCEDURE — 88305 TISSUE EXAM BY PATHOLOGIST: CPT | Performed by: DERMATOLOGY

## 2025-07-24 NOTE — PROGRESS NOTES
The following individual(s) verbally consented to be recorded using ambient AI listening technology and understand that they can each withdraw their consent to this listening technology at any point by asking the clinician to turn off or pause the recording:    Patient name: Linette Celaya  Additional names:

## 2025-07-27 NOTE — PROGRESS NOTES
Linette Celaya is a 74 year old female.  HPI:     CC:    Chief Complaint   Patient presents with    Lesion     LOV 24-Pt presents for removal of \"annoying\" skin lesion on R cheek.   Denies personal Hx of skin cancer. Family Hx of unknown skin cancer type(Parents).         Allergies:  Cat hair extract and Synvisc [hylan g-f 20]    HISTORY:    Past Medical History:    Aortic insufficiency    Asthma, mild intermittent (HCC)    Essential hypertension    Hemorrhoids    Hypothyroid    Obesity      Past Surgical History:   Procedure Laterality Date    Arthroscopy, knee, surgical; w/lateral release Bilateral           Colonoscopy  2012    D & c        Family History   Problem Relation Age of Onset    Basal Cell Father     Hypertension Father     Heart Disorder Father     Basal Cell Mother     Cancer Mother     Diabetes Paternal Grandmother       Social History     Socioeconomic History    Marital status:    Tobacco Use    Smoking status: Never    Smokeless tobacco: Never   Vaping Use    Vaping status: Never Used   Substance and Sexual Activity    Alcohol use: Not Currently     Alcohol/week: 14.0 standard drinks of alcohol     Types: 14 Glasses of wine per week     Comment: Socially    Drug use: No   Other Topics Concern    History of tanning Yes    Pt has a pacemaker No    Pt has a defibrillator No    Reaction to local anesthetic No    Caffeine Concern Yes     Comment: coffee 1-2 cups daily    Exercise No   Social History Narrative         Social Drivers of Health      Received from [facility]    Housing Stability        Current Outpatient Medications   Medication Sig Dispense Refill    Ammonium Lactate 12 % External Cream Apply to dry skin bid as directed 385 g 11    OMEPRAZOLE 40 MG Oral Capsule Delayed Release Take 1 capsule (40 mg total) by mouth daily. Before a meal 90 capsule 0    LEXAPRO 5 MG Oral Tab       fluticasone-salmeterol 250-50 MCG/ACT Inhalation Aerosol  Powder, Breath Activated Inhale 1 puff into the lungs 2 (two) times daily.      Rosuvastatin Calcium 5 MG Oral Capsule Sprinkle       celecoxib 100 MG Oral Cap Take 1 capsule (100 mg total) by mouth 2 (two) times daily as needed for Pain. 180 capsule 0    FLUTICASONE PROPIONATE 50 MCG/ACT Nasal Suspension USE 2 sprays IN EACH NOSTRIL DAILY 16 g 1    LOSARTAN 50 MG Oral Tab TAKE ONE TABLET BY MOUTH ONE TIME DAILY 90 tablet 0    MONTELUKAST 10 MG Oral Tab TAKE ONE TABLET BY MOUTH ONE TIME DAILY 90 tablet 1    levothyroxine 100 MCG Oral Tab Take 1 tablet (100 mcg total) by mouth daily. 90 tablet 3    levocetirizine 5 MG Oral Tab Take 1 tablet (5 mg total) by mouth every evening. 90 tablet 3    ALBUTEROL SULFATE  (90 Base) MCG/ACT Inhalation Aero Soln Inhale 1-2 puffs into the lungs every 6 (six) hours as needed for Wheezing or Shortness of Breath. 8.5 g 3    Omega-3 Fatty Acids (FISH OIL) 1200 MG Oral Cap Take by mouth 2 (two) times daily.      Ergocalciferol (VITAMIN D OR) Take by mouth daily.        Multiple Vitamins-Minerals (MULTIVITAMIN OR) Take by mouth daily.        cyclobenzaprine 10 MG Oral Tab Take 1 tablet (10 mg total) by mouth 3 (three) times daily as needed for Muscle spasms. 30 tablet 0    traMADol 50 MG Oral Tab Take 50 mg by mouth.      gabapentin 300 MG Oral Cap Take 1 capsule (300 mg total) by mouth 3 (three) times daily. 30 capsule 0    HYDROCHLOROTHIAZIDE 12.5 MG Oral Cap TAKE ONE CAPSULE BY MOUTH ONE TIME DAILY 90 capsule 1     Allergies:   Allergies   Allergen Reactions    Cat Hair Extract OTHER (SEE COMMENTS)     STUFFY NOSE    Synvisc [Hylan G-F 20] SWELLING and PAIN       Past Medical History:    Aortic insufficiency    Asthma, mild intermittent (HCC)    Essential hypertension    Hemorrhoids    Hypothyroid    Obesity     Past Surgical History:   Procedure Laterality Date    Arthroscopy, knee, surgical; w/lateral release Bilateral           Colonoscopy  2012    D & c        Social History     Socioeconomic History    Marital status:      Spouse name: Not on file    Number of children: Not on file    Years of education: Not on file    Highest education level: Not on file   Occupational History    Not on file   Tobacco Use    Smoking status: Never    Smokeless tobacco: Never   Vaping Use    Vaping status: Never Used   Substance and Sexual Activity    Alcohol use: Not Currently     Alcohol/week: 14.0 standard drinks of alcohol     Types: 14 Glasses of wine per week     Comment: Socially    Drug use: No    Sexual activity: Not on file   Other Topics Concern    Grew up on a farm Not Asked    History of tanning Yes    Outdoor occupation Not Asked    Pt has a pacemaker No    Pt has a defibrillator No    Breast feeding Not Asked    Reaction to local anesthetic No     Service Not Asked    Blood Transfusions Not Asked    Caffeine Concern Yes     Comment: coffee 1-2 cups daily    Occupational Exposure Not Asked    Hobby Hazards Not Asked    Sleep Concern Not Asked    Stress Concern Not Asked    Weight Concern Not Asked    Special Diet Not Asked    Back Care Not Asked    Exercise No    Bike Helmet Not Asked    Seat Belt Not Asked    Self-Exams Not Asked   Social History Narrative         Social Drivers of Health     Food Insecurity: Not on file   Transportation Needs: Not on file   Stress: Not on file   Housing Stability: Low Risk  (7/26/2021)    Received from [facility]    Housing Stability     Mortgage Payment Concerns?: Not on file     Number of Places Lived in the Last Year: Not on file     Unstable Housing?: Not on file     Family History   Problem Relation Age of Onset    Basal Cell Father     Hypertension Father     Heart Disorder Father     Basal Cell Mother     Cancer Mother     Diabetes Paternal Grandmother        There were no vitals filed for this visit.    HPI:    Chief Complaint   Patient presents with    Lesion     LOV 11/16/24-Pt presents for  removal of \"annoying\" skin lesion on R cheek.   Denies personal Hx of skin cancer. Family Hx of unknown skin cancer type(Parents).     For skin check.  History of CNH.  Stable    History of Present Illness  Linette Celaya is a 74 year old female who presents with skin lesions for evaluation and possible removal.    She has multiple skin lesions, with one in particular that is growing and becoming large. She has applied a topical treatment to it occasionally but finds it inconvenient due to its sticky nature. She is uncertain if freezing the lesion would be effective.    Another lesion is described as crusty and soft, with recent changes in its texture.    She recounts a recent fall where she tripped over a cat toy, noting it was not as severe as previous falls.    During a recent trip to [facility], she had a urinary tract infection and was advised to avoid sun exposure.    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      Patient presents with concerns above.    Past notes/ records and appropriate/relevant lab results including pathology and past body maps reviewed. Updated and new information noted in current visit.       Patient has been in their usual state of health.  History, medications, allergies reviewed as noted.      ROS:  Denies any other systemic complaints.  No new or changeing lesions other than noted above. No fevers, chills, night sweats, unusual sun sensitivity.  No other skin complaints.        History, medications, allergies reviewed as noted.       Physical Examination:     Well-developed well-nourished patient alert oriented in no acute distress.  Exam performed, including scalp, head, neck, face,back, chest  abdomen, arms, legs   Multiple light to medium brown, well marginated,   uniformly pigmented, macules and papules 6 mm and less scattered on exam. pigmented lesions examined with dermoscopy  benign-appearing patterns.     Waxy tannish keratotic papules scattered, cherry-red vascular papules scattered.    See map today's date for lesions noted .      Otherwise remarkable for lesions as noted on map.  See details of examination  See Assessment /Plan for additional history and physical exam also:    Assessment / plan:    Orders Placed This Encounter   Procedures    Specimen to Pathology, Tissue [[facility] Pt to [facility] lab]       Meds & Refills for this Visit:  Requested Prescriptions      No prescriptions requested or ordered in this encounter         Encounter Diagnoses   Name Primary?    Neoplasm of uncertain behavior of skin Yes    AK (actinic keratosis)     SK (seborrheic keratosis)     Lentigo     Benign neoplasm of skin, unspecified location     Multiple nevi     Skin cancer screening        See details on map.      Remarkable for:      Physical Exam  SKIN: Lesion growing and getting big. Lesion crusty and soft. Precancerous lesions present.    Results        Assessment & Plan  Seborrheic keratosis  Lesion is growing, crusty, sticky, and scaly. She previously declined removal but now wishes to proceed.  - Proceed with removal of seborrheic keratosis.    Actinic keratosis  Presence of precancerous lesions, described as crusty and soft, indicating potential progression.    Recording duration: 3 minutes          .Patient with keratotic lesion changing multiple colors at right mid cheek 1 cm rule out atypical pigmented lesion, SCC,   Shave/ tangential biopsy performed, operative note and consent in chart further plans pending pathology        Erythematous scaling keratotic papules noted at sites noted on map  Actinic Keratoses.  Precancerous nature discussed. Sun protection, sunscreen/ blocks encouraged Lesions treated with cryo- .  Biopsy if not resolved.    Right nasal sidewall right temple left jawline X3    , moderate sun damage    History of chondrodermatitis nodularis helicis bilateral helical rims  appear resolved no active lesions monitor.    Family history BCC continue, careful sun protection.  Annual follow-up.  Skin care regimen discussed.  Reassurance regarding benign keratoses.    Extensive benign keratoses scattered over chest shoulders back lentigines over the arms hands.  No suspicious lesions  Alphahydroxy acids including lactic acid, glycolic acid, urea, salicylic acid may be helpful.  Suggest over-the-counter products such as Goldbond psoriasis cream 3% salicylic acid, rough and bumpy, Cerave SA cream, AmLactin, and others as available with these ingredients.  Lactic acid  May use over the forehead    Other benign nevi lentigines keratoses no significant changes numerous benign keratoses.     No other susupicious lesions on todays  exam.    Family history of BCC continue regular follow-up  Please refer to map for specific lesions.  See additional diagnoses.  Pros cons of various therapies, risks benefits discussed.Pathophysiology discussed with patient.  Therapeutic options reviewed.  See  Medications in grid.  Instructions reviewed at length.    Benign nevi, seborrheic  keratoses, cherry angiomas:  Reassurance regarding other benign skin lesions.    Monitor for new or changing lesions. Signs and symptoms of skin cancer, ABCDE's of melanoma ( additional information available at AAD.org, skincancer.org) Encourage Sunscreen (broad-spectrum, ideally mineral-based-UVA/UVB -SPF 30 or higher) use encouraged, sun protection/sun protective clothing, self exams reviewed Followup as noted RTC ---routine checkup 6 mos -one year or p.r.n.    Encounter Times   Including precharting, reviewing chart, prior notes obtaining history: 10 minutes, medical exam :10 minutes, notes on body map, plan, counseling 10minutes My total time spent caring for the patient on the day of the encounter: 30 minutes     The patient indicates understanding of these issues and agrees to the plan.  The patient is asked to return as  noted in follow-up/ above.    This note was generated using Dragon voice recognition software.  Please contact me regarding any confusion resulting from errors in recognition..  Note to patient and family: The 21st Century Cures Act makes medical notes like these available to patients. However, be advised this is a medical document. It is intended as [id number] communication and monitoring of a patient's care needs. It is written in medical language and may contain abbreviations or verbiage that are unfamiliar. It may appear blunt or direct. Medical documents are intended to carry relevant information, facts as evident and the clinical opinion of the practitioner.

## 2025-07-27 NOTE — PROGRESS NOTES
Operative Report                     Shave/  Tangential biopsy     Clinical diagnosis:    Size of lesion:Patient with keratotic lesion changing multiple colors at right mid cheek 1 cm rule out atypical pigmented lesion, SCC,     Location:    Procedure:    With patient in appropriate position the skin of the above was scrubbed with alcohol.  Anesthesia was obtained with 1% Xylocaine with epinephrine.  The skin surrounding the lesion was placed under tension and the lesion was incised using a #15 scalpel blade.  The specimen was sent for histopathologic exam.    Hemostasis was obtained with electrocautery/aluminum chloride.  Estimated blood loss less than 2 cc.    Biopsy dressed with Polysporin, bandage.    Pressure dressing:   No    Complications: None    Written instructions given and reviewed with patient    Await pathology    Contact information reviewed.    Procedural physician:  Giana Zimmer MD

## 2025-07-29 ENCOUNTER — RESULTS FOLLOW-UP (OUTPATIENT)
Dept: DERMATOLOGY CLINIC | Facility: CLINIC | Age: 75
End: 2025-07-29

## (undated) DIAGNOSIS — Z98.890 S/P LEFT KNEE ARTHROSCOPY: Primary | ICD-10-CM

## (undated) DIAGNOSIS — Z01.818 PREOP EXAMINATION: ICD-10-CM

## (undated) DIAGNOSIS — M17.11 RIGHT KNEE DJD: ICD-10-CM

## (undated) DIAGNOSIS — Z96.651 STATUS POST RIGHT KNEE REPLACEMENT: Primary | ICD-10-CM

## (undated) DIAGNOSIS — Z96.652 S/P TOTAL KNEE REPLACEMENT, LEFT: Primary | ICD-10-CM

## (undated) NOTE — LETTER
DAI. Notifier: Fredi/[employer]   JIMY. Patient Name: Carman Curling. Identification Number: [id number]      Advance Beneficiary Notice of Noncoverage (ABN)  NOTE:  If Medicare doesn’t pay for KELLE Lewis's cyst aspiration and steroid injection  below, you ? OPTION 2. I want the KELLE Lewis's cyst aspiration and steroid injection listed above, but do not bill Medicare. You may ask to be paid now as I am responsible for payment. I cannot appeal if Medicare is not billed. ? OPTION 3. I don’t want the KELLE Lewis'

## (undated) NOTE — LETTER
[address], [address] [facility]  Dept: [phone]    Nolene Saint, DO  Physical Medicine    Injection Instructions    We will check with your insurance company for pre-authorization. hydrocodone found in Vicodin, Lortab, Tylenol and Ultram.  II. Most injections are done with local anesthetics. Some injections may require sedation. Hold food and drink if having IV sedation for the injection.  [facility] will give [facility] details salima

## (undated) NOTE — LETTER
AUTHORIZATION FOR SURGICAL OPERATION OR OTHER PROCEDURE    1.  I hereby authorize Dr. Judson Carlson and the [facility] Office staff assigned to my case to perform the following operation and/or procedure at the [facility] Office:    Bilateral Knee Synvisc Injections unde Relationship to Patient:           []  Parent    Responsible person                          []  Spouse  In case of minor or                    [] Other  _____________   Incompetent name:  __________________________________________________

## (undated) NOTE — LETTER
AUTHORIZATION FOR SURGICAL OPERATION OR OTHER PROCEDURE    1.  I hereby authorize Dr. Rm Nuñez and the [facility] staff assigned to my case to perform the following operation and/or procedure at the [facility]:    Bilateral knee joint steroid injection Relationship to Patient:           []  Parent    Responsible person                          []  Spouse  In case of minor or                    [] Other  _____________   Incompetent name:  __________________________________________________

## (undated) NOTE — LETTER
[address]   Date:   9/10/2019     Name:   Viv Willingham    YOB: 1950   MRN:   [id number]       WHERE IS YOUR PAIN NOW? Remington the areas on your body where you feel the described sensations.   Use the appropriate sy

## (undated) NOTE — LETTER
[address]   Date:   8/17/2021     Name:   Duarte Green    YOB: 1950   MRN:   [id number]       WHERE IS YOUR PAIN NOW? Remington the areas on your body where you feel the described sensations.   Use the appropriate sy

## (undated) NOTE — LETTER
AUTHORIZATION FOR SURGICAL OPERATION OR OTHER PROCEDURE    1.  I hereby authorize Dr. Whitney Beck and the [facility] staff assigned to my case to perform the following operation and/or procedure at the [facility]:    __Left knee aspiration and injection with CSI Time:  ________ A. M.  P.M.        Patient Name:  Tyson Kong 11/29/1950 [id number]____________________________________________________  (please print)       Patient signature:  ___________________________________________________

## (undated) NOTE — LETTER
[address]   Date:   10/17/2019     Name:   Jose Burns    YOB: 1950   MRN:   [id number]       WHERE IS YOUR PAIN NOW? Remington the areas on your body where you feel the described sensations.   Use the appropriate s

## (undated) NOTE — LETTER
AUTHORIZATION FOR SURGICAL OPERATION OR OTHER PROCEDURE    1.  I hereby authorize Dr. Candie Mccauley and the [facility] staff assigned to my case to perform the following operation and/or procedure at the [facility]:    Baker's cyst aspiration and steroid in Relationship to Patient:           []  Parent    Responsible person                          []  Spouse  In case of minor or                    [] Other  _____________   Incompetent name:  __________________________________________________

## (undated) NOTE — LETTER
DMITRIY Notifier: Fredi/[employer]   JIMY. Patient Name: Paul Abdi. Identification Number: [id number]      Advance Beneficiary Notice of Noncoverage (ABN)  NOTE:  If Medicare doesn’t pay for D. Item/service(s) below, you may have to pay.   Medicare pool and I cannot appeal to see if Medicare would pay. H. Additional Information: This notice gives our opinion, not an official Medicare decision.  If you have other questions on this notice or Medicare billing, call 1-800-MEDICARE (1-148.114.2030/TTY: 1-8

## (undated) NOTE — LETTER
AUTHORIZATION FOR SURGICAL OPERATION OR OTHER PROCEDURE    1. I hereby authorize Dr Narcisa Camp and the [facility] staff assigned to my case to perform the following operation and/or procedure at the [facility]:     SYNVSIC 1 injection under US guidance    2. []  Parent    Responsible person                          []  Spouse  In case of minor or                    [] Other  _____________   Incompetent name:  __________________________________________________                               (please print)

## (undated) NOTE — LETTER
[facility] SURGERY SCHEDULING FORM   [address] [facility]   [phone] (scheduling phone) [phone] (scheduling fax)     PATIENT INFORMATION   Last Name:      Jeffery Marcial      First Name:    Sadia Bella []  No or using our own   Allergies: Patient has no known allergies.          Completed by:    Catalino Kidd      Date:    1/2/2020

## (undated) NOTE — LETTER
DAI. Notifier: Fredi/[employer]   B. Patient Name: Estefany Mckeon.  Identification Number: [id number]      Advance Beneficiary Notice of Noncoverage (ABN)  NOTE:  If Medicare doesn’t pay for D. Bilateral knee joint steroid injection under US guidance directions on the MSN. If Medicare does pay, you will refund any payments I made to you, less co-pays or deductibles. ? OPTION 2. I want the D. Bilateral knee joint steroid injection under US guidance listed above, but do not bill Medicare.  You may ask

## (undated) NOTE — LETTER
[address]   Date:   1/21/2021     Name:   Jose Burns    YOB: 1950   MRN:   [id number]       WHERE IS YOUR PAIN NOW? Remington the areas on your body where you feel the described sensations.   Use the appropriate sy

## (undated) NOTE — LETTER
DMITRIY Notifier: Fredi/[employer]   JIMY. Patient Name: Rosa Mcclelland. Identification Number: [id number]      Advance Beneficiary Notice of Noncoverage (ABN)  NOTE:  If Medicare doesn’t pay for D. item/service below, you may have to pay.   Medicare does n Medicare. You may ask to be paid now as I am responsible for payment. I cannot appeal if Medicare is not billed. ? OPTION 3. I don’t want the D. item/service listed above.  I understand with this choice  I am not responsible for payment, and I cannot appea

## (undated) NOTE — LETTER
[address]   Date:   8/10/2020     Name:   Gabbi Mesa    YOB: 1950   MRN:   [id number]       WHERE IS YOUR PAIN NOW? Remington the areas on your body where you feel the described sensations.   Use the appropriate sy

## (undated) NOTE — LETTER
[address]   Date:   12/19/2019     Name:   Andres Castillo    YOB: 1950   MRN:   [id number]       WHERE IS YOUR PAIN NOW? Remington the areas on your body where you feel the described sensations.   Use the appropriate s

## (undated) NOTE — LETTER
DMITRIY Notifier: Fredi/[employer]   JIMY. Patient Name: Dulce Maria Junior. Identification Number: [id number]      Advance Beneficiary Notice of Noncoverage (ABN)  NOTE:  If Medicare doesn’t pay for D. Item/service(s) below, you may have to pay.   Medicare pool to you, less co-pays or deductibles. ? OPTION 2. I want the D. Item/service(s) listed above, but do not bill Medicare. You may ask to be paid now as I am responsible for payment. I cannot appeal if Medicare is not billed. ? OPTION 3.  I don’t want the D

## (undated) NOTE — LETTER
[facility] SURGERY SCHEDULING FORM   [address]  [address] [facility]   [phone] (scheduling phone) [phone] (scheduling fax)     PATIENT INFORMATION   Last Name:      Mary Anne Shell      First Name:    Maxwell Smith Allergies: Patient has no known allergies.          Completed by:    Isabela Unger      Date:    11/4/2019